# Patient Record
Sex: MALE | Race: WHITE
[De-identification: names, ages, dates, MRNs, and addresses within clinical notes are randomized per-mention and may not be internally consistent; named-entity substitution may affect disease eponyms.]

---

## 2018-07-09 ENCOUNTER — HOSPITAL ENCOUNTER (INPATIENT)
Dept: HOSPITAL 4 - SED | Age: 78
LOS: 9 days | Discharge: NURSING, REHAB OR LONG-TERM CARE | DRG: 720 | End: 2018-07-18
Attending: INTERNAL MEDICINE | Admitting: INTERNAL MEDICINE
Payer: MEDICAID

## 2018-07-09 VITALS — HEIGHT: 60 IN | BODY MASS INDEX: 23.36 KG/M2 | WEIGHT: 119 LBS

## 2018-07-09 VITALS — SYSTOLIC BLOOD PRESSURE: 114 MMHG

## 2018-07-09 VITALS — SYSTOLIC BLOOD PRESSURE: 93 MMHG

## 2018-07-09 VITALS — SYSTOLIC BLOOD PRESSURE: 94 MMHG

## 2018-07-09 DIAGNOSIS — Z22.322: ICD-10-CM

## 2018-07-09 DIAGNOSIS — E43: ICD-10-CM

## 2018-07-09 DIAGNOSIS — Z93.1: ICD-10-CM

## 2018-07-09 DIAGNOSIS — Z79.2: ICD-10-CM

## 2018-07-09 DIAGNOSIS — A41.9: Primary | ICD-10-CM

## 2018-07-09 DIAGNOSIS — Z74.01: ICD-10-CM

## 2018-07-09 DIAGNOSIS — N39.0: ICD-10-CM

## 2018-07-09 DIAGNOSIS — K21.9: ICD-10-CM

## 2018-07-09 DIAGNOSIS — Z79.01: ICD-10-CM

## 2018-07-09 DIAGNOSIS — Z79.899: ICD-10-CM

## 2018-07-09 DIAGNOSIS — N17.0: ICD-10-CM

## 2018-07-09 DIAGNOSIS — Z93.2: ICD-10-CM

## 2018-07-09 DIAGNOSIS — E87.6: ICD-10-CM

## 2018-07-09 DIAGNOSIS — I48.91: ICD-10-CM

## 2018-07-09 DIAGNOSIS — F79: ICD-10-CM

## 2018-07-09 DIAGNOSIS — G80.9: ICD-10-CM

## 2018-07-09 DIAGNOSIS — I10: ICD-10-CM

## 2018-07-09 DIAGNOSIS — E87.0: ICD-10-CM

## 2018-07-09 LAB
ALBUMIN SERPL BCP-MCNC: 2.4 G/DL (ref 3.4–4.8)
ALT SERPL W P-5'-P-CCNC: 37 U/L (ref 12–78)
ANION GAP SERPL CALCULATED.3IONS-SCNC: 11 MMOL/L (ref 5–15)
AST SERPL W P-5'-P-CCNC: 26 U/L (ref 10–37)
BASOPHILS # BLD AUTO: 0.1 K/UL (ref 0–0.2)
BASOPHILS NFR BLD AUTO: 0.2 % (ref 0–2)
BILIRUB SERPL-MCNC: 1.1 MG/DL (ref 0–1)
BUN SERPL-MCNC: 83 MG/DL (ref 8–21)
CALCIUM SERPL-MCNC: 9.7 MG/DL (ref 8.4–11)
CHLORIDE SERPL-SCNC: 109 MMOL/L (ref 98–107)
CREAT SERPL-MCNC: 1.67 MG/DL (ref 0.55–1.3)
EOSINOPHIL # BLD AUTO: 0 K/UL (ref 0–0.4)
EOSINOPHIL NFR BLD AUTO: 0.1 % (ref 0–4)
ERYTHROCYTE [DISTWIDTH] IN BLOOD BY AUTOMATED COUNT: 16.1 % (ref 9–15)
GFR SERPL CREATININE-BSD FRML MDRD: (no result) ML/MIN (ref 90–?)
GLUCOSE SERPL-MCNC: 125 MG/DL (ref 70–99)
HCT VFR BLD AUTO: 38.6 % (ref 36–54)
HGB BLD-MCNC: 13 G/DL (ref 14–18)
INR PPP: 1.1 (ref 0.8–1.2)
LYMPHOCYTES # BLD AUTO: 1.6 K/UL (ref 1–5.5)
LYMPHOCYTES NFR BLD AUTO: 5.8 % (ref 20.5–51.5)
MCH RBC QN AUTO: 30 PG (ref 27–31)
MCHC RBC AUTO-ENTMCNC: 34 % (ref 32–36)
MCV RBC AUTO: 88 FL (ref 79–98)
MONOCYTES # BLD MANUAL: 0.4 K/UL (ref 0–1)
MONOCYTES # BLD MANUAL: 1.5 % (ref 1.7–9.3)
NEUTROPHILS # BLD AUTO: 24.8 K/UL (ref 1.8–7.7)
NEUTROPHILS NFR BLD AUTO: 92.4 % (ref 40–70)
PLATELET # BLD AUTO: 338 K/UL (ref 130–430)
POTASSIUM SERPL-SCNC: 4.8 MMOL/L (ref 3.5–5.1)
PROTHROMBIN TIME: 11.2 SECS (ref 9.5–12.5)
RBC # BLD AUTO: 4.37 MIL/UL (ref 4.2–6.2)
SODIUM SERPLBLD-SCNC: 142 MMOL/L (ref 136–145)
WBC # BLD AUTO: 26.9 K/UL (ref 4.8–10.8)

## 2018-07-09 PROCEDURE — A9547 IN111 OXYQUINOLINE: HCPCS

## 2018-07-09 PROCEDURE — A5061 POUCH DRAINABLE W BARRIER AT: HCPCS

## 2018-07-09 RX ADMIN — DEXTROSE AND SODIUM CHLORIDE SCH MLS/HR: 5; 900 INJECTION, SOLUTION INTRAVENOUS at 19:55

## 2018-07-09 RX ADMIN — METRONIDAZOLE SCH MLS/HR: 500 SOLUTION INTRAVENOUS at 22:02

## 2018-07-09 RX ADMIN — METRONIDAZOLE SCH MLS/HR: 500 SOLUTION INTRAVENOUS at 19:55

## 2018-07-09 RX ADMIN — TAZOBACTAM SODIUM AND PIPERACILLIN SODIUM SCH MLS/HR: 375; 3 INJECTION, SOLUTION INTRAVENOUS at 23:48

## 2018-07-10 VITALS — SYSTOLIC BLOOD PRESSURE: 105 MMHG

## 2018-07-10 VITALS — SYSTOLIC BLOOD PRESSURE: 102 MMHG

## 2018-07-10 VITALS — SYSTOLIC BLOOD PRESSURE: 108 MMHG

## 2018-07-10 VITALS — SYSTOLIC BLOOD PRESSURE: 103 MMHG

## 2018-07-10 LAB
ALBUMIN SERPL BCP-MCNC: 1.9 G/DL (ref 3.4–4.8)
ALT SERPL W P-5'-P-CCNC: 27 U/L (ref 12–78)
ANION GAP SERPL CALCULATED.3IONS-SCNC: 8 MMOL/L (ref 5–15)
APPEARANCE UR: CLEAR
AST SERPL W P-5'-P-CCNC: 17 U/L (ref 10–37)
BACTERIA URNS QL MICRO: (no result) /HPF
BASOPHILS # BLD AUTO: 0 K/UL (ref 0–0.2)
BASOPHILS NFR BLD AUTO: 0.3 % (ref 0–2)
BILIRUB SERPL-MCNC: 0.6 MG/DL (ref 0–1)
BILIRUB UR QL STRIP: NEGATIVE
BUN SERPL-MCNC: 57 MG/DL (ref 8–21)
CALCIUM SERPL-MCNC: 9.1 MG/DL (ref 8.4–11)
CHLORIDE SERPL-SCNC: 122 MMOL/L (ref 98–107)
COLOR UR: YELLOW
CREAT SERPL-MCNC: 0.88 MG/DL (ref 0.55–1.3)
EOSINOPHIL # BLD AUTO: 0.4 K/UL (ref 0–0.4)
EOSINOPHIL NFR BLD AUTO: 3 % (ref 0–4)
ERYTHROCYTE [DISTWIDTH] IN BLOOD BY AUTOMATED COUNT: 15.8 % (ref 9–15)
GFR SERPL CREATININE-BSD FRML MDRD: (no result) ML/MIN (ref 90–?)
GLUCOSE SERPL-MCNC: 119 MG/DL (ref 70–99)
GLUCOSE UR STRIP-MCNC: NEGATIVE MG/DL
HCT VFR BLD AUTO: 30.3 % (ref 36–54)
HGB BLD-MCNC: 10.4 G/DL (ref 14–18)
HGB UR QL STRIP: (no result)
KETONES UR STRIP-MCNC: NEGATIVE MG/DL
LEUKOCYTE ESTERASE UR QL STRIP: (no result)
LYMPHOCYTES # BLD AUTO: 0.7 K/UL (ref 1–5.5)
LYMPHOCYTES NFR BLD AUTO: 4.7 % (ref 20.5–51.5)
MCH RBC QN AUTO: 30 PG (ref 27–31)
MCHC RBC AUTO-ENTMCNC: 34 % (ref 32–36)
MCV RBC AUTO: 88 FL (ref 79–98)
MONOCYTES # BLD MANUAL: 0.4 K/UL (ref 0–1)
MONOCYTES # BLD MANUAL: 3.1 % (ref 1.7–9.3)
NEUTROPHILS # BLD AUTO: 13 K/UL (ref 1.8–7.7)
NEUTROPHILS NFR BLD AUTO: 88.9 % (ref 40–70)
NITRITE UR QL STRIP: NEGATIVE
PH UR STRIP: >=9 [PH] (ref 5–8)
PLATELET # BLD AUTO: 244 K/UL (ref 130–430)
POTASSIUM SERPL-SCNC: 3.3 MMOL/L (ref 3.5–5.1)
PROT UR QL STRIP: (no result)
RBC # BLD AUTO: 3.43 MIL/UL (ref 4.2–6.2)
RBC #/AREA URNS HPF: (no result) /HPF (ref 0–3)
SODIUM SERPLBLD-SCNC: 151 MMOL/L (ref 136–145)
SP GR UR STRIP: 1.01 (ref 1–1.03)
UROBILINOGEN UR STRIP-MCNC: 1 MG/DL (ref 0.2–1)
WBC # BLD AUTO: 14.5 K/UL (ref 4.8–10.8)
WBC #/AREA URNS HPF: (no result) /HPF (ref 0–3)

## 2018-07-10 RX ADMIN — DEXTROSE AND SODIUM CHLORIDE SCH MLS/HR: 5; 900 INJECTION, SOLUTION INTRAVENOUS at 05:32

## 2018-07-10 RX ADMIN — TAZOBACTAM SODIUM AND PIPERACILLIN SODIUM SCH MLS/HR: 375; 3 INJECTION, SOLUTION INTRAVENOUS at 17:28

## 2018-07-10 RX ADMIN — SODIUM BICARBONATE SCH MLS/HR: 84 INJECTION, SOLUTION INTRAVENOUS at 18:43

## 2018-07-10 RX ADMIN — METRONIDAZOLE SCH MLS/HR: 500 SOLUTION INTRAVENOUS at 13:18

## 2018-07-10 RX ADMIN — TAZOBACTAM SODIUM AND PIPERACILLIN SODIUM SCH MLS/HR: 375; 3 INJECTION, SOLUTION INTRAVENOUS at 11:32

## 2018-07-10 RX ADMIN — DEXTROSE AND SODIUM CHLORIDE SCH MLS/HR: 5; 900 INJECTION, SOLUTION INTRAVENOUS at 13:18

## 2018-07-10 RX ADMIN — TAZOBACTAM SODIUM AND PIPERACILLIN SODIUM SCH MLS/HR: 375; 3 INJECTION, SOLUTION INTRAVENOUS at 05:33

## 2018-07-10 RX ADMIN — METRONIDAZOLE SCH MLS/HR: 500 SOLUTION INTRAVENOUS at 22:09

## 2018-07-10 RX ADMIN — METRONIDAZOLE SCH MLS/HR: 500 SOLUTION INTRAVENOUS at 05:33

## 2018-07-11 VITALS — SYSTOLIC BLOOD PRESSURE: 114 MMHG

## 2018-07-11 VITALS — SYSTOLIC BLOOD PRESSURE: 110 MMHG

## 2018-07-11 VITALS — SYSTOLIC BLOOD PRESSURE: 106 MMHG

## 2018-07-11 VITALS — SYSTOLIC BLOOD PRESSURE: 123 MMHG

## 2018-07-11 VITALS — SYSTOLIC BLOOD PRESSURE: 124 MMHG

## 2018-07-11 LAB
ALBUMIN SERPL BCP-MCNC: 1.9 G/DL (ref 3.4–4.8)
ALT SERPL W P-5'-P-CCNC: 26 U/L (ref 12–78)
ANION GAP SERPL CALCULATED.3IONS-SCNC: 7 MMOL/L (ref 5–15)
AST SERPL W P-5'-P-CCNC: 17 U/L (ref 10–37)
BASOPHILS # BLD AUTO: 0.1 K/UL (ref 0–0.2)
BASOPHILS NFR BLD AUTO: 0.6 % (ref 0–2)
BILIRUB SERPL-MCNC: 0.5 MG/DL (ref 0–1)
BUN SERPL-MCNC: 37 MG/DL (ref 8–21)
CALCIUM SERPL-MCNC: 9.1 MG/DL (ref 8.4–11)
CHLORIDE SERPL-SCNC: 123 MMOL/L (ref 98–107)
CREAT SERPL-MCNC: 0.73 MG/DL (ref 0.55–1.3)
EOSINOPHIL # BLD AUTO: 0.9 K/UL (ref 0–0.4)
EOSINOPHIL NFR BLD AUTO: 8 % (ref 0–4)
ERYTHROCYTE [DISTWIDTH] IN BLOOD BY AUTOMATED COUNT: 16.4 % (ref 9–15)
GFR SERPL CREATININE-BSD FRML MDRD: (no result) ML/MIN (ref 90–?)
GLUCOSE SERPL-MCNC: 109 MG/DL (ref 70–99)
HCT VFR BLD AUTO: 31.1 % (ref 36–54)
HGB BLD-MCNC: 10.5 G/DL (ref 14–18)
LYMPHOCYTES # BLD AUTO: 0.5 K/UL (ref 1–5.5)
LYMPHOCYTES NFR BLD AUTO: 4.2 % (ref 20.5–51.5)
MCH RBC QN AUTO: 30 PG (ref 27–31)
MCHC RBC AUTO-ENTMCNC: 34 % (ref 32–36)
MCV RBC AUTO: 89 FL (ref 79–98)
MONOCYTES # BLD MANUAL: 0.6 K/UL (ref 0–1)
MONOCYTES # BLD MANUAL: 4.9 % (ref 1.7–9.3)
NEUTROPHILS # BLD AUTO: 9.3 K/UL (ref 1.8–7.7)
NEUTROPHILS NFR BLD AUTO: 82.3 % (ref 40–70)
PLATELET # BLD AUTO: 242 K/UL (ref 130–430)
POTASSIUM SERPL-SCNC: 2.9 MMOL/L (ref 3.5–5.1)
RBC # BLD AUTO: 3.51 MIL/UL (ref 4.2–6.2)
SODIUM SERPLBLD-SCNC: 150 MMOL/L (ref 136–145)
WBC # BLD AUTO: 11.4 K/UL (ref 4.8–10.8)

## 2018-07-11 RX ADMIN — METRONIDAZOLE SCH MLS/HR: 500 SOLUTION INTRAVENOUS at 06:13

## 2018-07-11 RX ADMIN — METRONIDAZOLE SCH MLS/HR: 500 SOLUTION INTRAVENOUS at 21:10

## 2018-07-11 RX ADMIN — METRONIDAZOLE SCH MLS/HR: 500 SOLUTION INTRAVENOUS at 14:26

## 2018-07-11 RX ADMIN — TAZOBACTAM SODIUM AND PIPERACILLIN SODIUM SCH MLS/HR: 375; 3 INJECTION, SOLUTION INTRAVENOUS at 00:09

## 2018-07-11 RX ADMIN — SODIUM BICARBONATE SCH MLS/HR: 84 INJECTION, SOLUTION INTRAVENOUS at 23:16

## 2018-07-11 RX ADMIN — SODIUM BICARBONATE SCH MLS/HR: 84 INJECTION, SOLUTION INTRAVENOUS at 23:06

## 2018-07-11 RX ADMIN — TAZOBACTAM SODIUM AND PIPERACILLIN SODIUM SCH MLS/HR: 375; 3 INJECTION, SOLUTION INTRAVENOUS at 05:24

## 2018-07-11 RX ADMIN — TAZOBACTAM SODIUM AND PIPERACILLIN SODIUM SCH MLS/HR: 375; 3 INJECTION, SOLUTION INTRAVENOUS at 18:26

## 2018-07-11 RX ADMIN — TAZOBACTAM SODIUM AND PIPERACILLIN SODIUM SCH MLS/HR: 375; 3 INJECTION, SOLUTION INTRAVENOUS at 23:16

## 2018-07-11 RX ADMIN — TAZOBACTAM SODIUM AND PIPERACILLIN SODIUM SCH MLS/HR: 375; 3 INJECTION, SOLUTION INTRAVENOUS at 13:12

## 2018-07-12 VITALS — SYSTOLIC BLOOD PRESSURE: 134 MMHG

## 2018-07-12 VITALS — SYSTOLIC BLOOD PRESSURE: 147 MMHG

## 2018-07-12 VITALS — SYSTOLIC BLOOD PRESSURE: 139 MMHG

## 2018-07-12 VITALS — SYSTOLIC BLOOD PRESSURE: 121 MMHG

## 2018-07-12 VITALS — SYSTOLIC BLOOD PRESSURE: 122 MMHG

## 2018-07-12 LAB
ALBUMIN SERPL BCP-MCNC: 2 G/DL (ref 3.4–4.8)
ALT SERPL W P-5'-P-CCNC: 18 U/L (ref 12–78)
ANION GAP SERPL CALCULATED.3IONS-SCNC: 9 MMOL/L (ref 5–15)
AST SERPL W P-5'-P-CCNC: 12 U/L (ref 10–37)
BASOPHILS # BLD AUTO: 0 K/UL (ref 0–0.2)
BASOPHILS NFR BLD AUTO: 0.5 % (ref 0–2)
BILIRUB SERPL-MCNC: 0.6 MG/DL (ref 0–1)
BUN SERPL-MCNC: 18 MG/DL (ref 8–21)
CALCIUM SERPL-MCNC: 8.6 MG/DL (ref 8.4–11)
CHLORIDE SERPL-SCNC: 118 MMOL/L (ref 98–107)
CREAT SERPL-MCNC: 0.58 MG/DL (ref 0.55–1.3)
EOSINOPHIL # BLD AUTO: 0.5 K/UL (ref 0–0.4)
EOSINOPHIL NFR BLD AUTO: 7 % (ref 0–4)
ERYTHROCYTE [DISTWIDTH] IN BLOOD BY AUTOMATED COUNT: 15.9 % (ref 9–15)
GFR SERPL CREATININE-BSD FRML MDRD: (no result) ML/MIN (ref 90–?)
GLUCOSE SERPL-MCNC: 90 MG/DL (ref 70–99)
HCT VFR BLD AUTO: 31.8 % (ref 36–54)
HGB BLD-MCNC: 10.6 G/DL (ref 14–18)
LYMPHOCYTES # BLD AUTO: 1.1 K/UL (ref 1–5.5)
LYMPHOCYTES NFR BLD AUTO: 17.2 % (ref 20.5–51.5)
MCH RBC QN AUTO: 29 PG (ref 27–31)
MCHC RBC AUTO-ENTMCNC: 33 % (ref 32–36)
MCV RBC AUTO: 88 FL (ref 79–98)
MONOCYTES # BLD MANUAL: 0.5 K/UL (ref 0–1)
MONOCYTES # BLD MANUAL: 8.2 % (ref 1.7–9.3)
NEUTROPHILS # BLD AUTO: 4.4 K/UL (ref 1.8–7.7)
NEUTROPHILS NFR BLD AUTO: 67.1 % (ref 40–70)
PLATELET # BLD AUTO: 232 K/UL (ref 130–430)
POTASSIUM SERPL-SCNC: 3.2 MMOL/L (ref 3.5–5.1)
RBC # BLD AUTO: 3.6 MIL/UL (ref 4.2–6.2)
SODIUM SERPLBLD-SCNC: 148 MMOL/L (ref 136–145)
WBC # BLD AUTO: 6.5 K/UL (ref 4.8–10.8)

## 2018-07-12 RX ADMIN — MUPIROCIN SCH APPLIC: 20 OINTMENT TOPICAL at 21:59

## 2018-07-12 RX ADMIN — METRONIDAZOLE SCH MLS/HR: 500 SOLUTION INTRAVENOUS at 21:58

## 2018-07-12 RX ADMIN — SODIUM BICARBONATE SCH MLS/HR: 84 INJECTION, SOLUTION INTRAVENOUS at 14:28

## 2018-07-12 RX ADMIN — CASTOR OIL AND BALSAM, PERU SCH GM: 788; 87 OINTMENT TOPICAL at 10:08

## 2018-07-12 RX ADMIN — TAZOBACTAM SODIUM AND PIPERACILLIN SODIUM SCH MLS/HR: 375; 3 INJECTION, SOLUTION INTRAVENOUS at 18:30

## 2018-07-12 RX ADMIN — TAZOBACTAM SODIUM AND PIPERACILLIN SODIUM SCH MLS/HR: 375; 3 INJECTION, SOLUTION INTRAVENOUS at 12:21

## 2018-07-12 RX ADMIN — METRONIDAZOLE SCH MLS/HR: 500 SOLUTION INTRAVENOUS at 14:27

## 2018-07-12 RX ADMIN — METRONIDAZOLE SCH MLS/HR: 500 SOLUTION INTRAVENOUS at 05:44

## 2018-07-12 RX ADMIN — TAZOBACTAM SODIUM AND PIPERACILLIN SODIUM SCH MLS/HR: 375; 3 INJECTION, SOLUTION INTRAVENOUS at 05:17

## 2018-07-13 VITALS — SYSTOLIC BLOOD PRESSURE: 111 MMHG

## 2018-07-13 VITALS — SYSTOLIC BLOOD PRESSURE: 140 MMHG

## 2018-07-13 VITALS — SYSTOLIC BLOOD PRESSURE: 123 MMHG

## 2018-07-13 VITALS — SYSTOLIC BLOOD PRESSURE: 143 MMHG

## 2018-07-13 VITALS — SYSTOLIC BLOOD PRESSURE: 132 MMHG

## 2018-07-13 RX ADMIN — SODIUM BICARBONATE SCH MLS/HR: 84 INJECTION, SOLUTION INTRAVENOUS at 00:20

## 2018-07-13 RX ADMIN — TAZOBACTAM SODIUM AND PIPERACILLIN SODIUM SCH MLS/HR: 375; 3 INJECTION, SOLUTION INTRAVENOUS at 00:18

## 2018-07-13 RX ADMIN — MUPIROCIN SCH APPLIC: 20 OINTMENT TOPICAL at 09:25

## 2018-07-13 RX ADMIN — SODIUM BICARBONATE SCH MLS/HR: 84 INJECTION, SOLUTION INTRAVENOUS at 11:13

## 2018-07-13 RX ADMIN — DEXTROSE SCH MLS/HR: 50 INJECTION, SOLUTION INTRAVENOUS at 22:34

## 2018-07-13 RX ADMIN — METRONIDAZOLE SCH MLS/HR: 500 SOLUTION INTRAVENOUS at 13:16

## 2018-07-13 RX ADMIN — TAZOBACTAM SODIUM AND PIPERACILLIN SODIUM SCH MLS/HR: 375; 3 INJECTION, SOLUTION INTRAVENOUS at 06:27

## 2018-07-13 RX ADMIN — METRONIDAZOLE SCH MLS/HR: 500 SOLUTION INTRAVENOUS at 06:27

## 2018-07-13 RX ADMIN — CASTOR OIL AND BALSAM, PERU SCH APPLIC: 788; 87 OINTMENT TOPICAL at 09:26

## 2018-07-13 RX ADMIN — TAZOBACTAM SODIUM AND PIPERACILLIN SODIUM SCH MLS/HR: 375; 3 INJECTION, SOLUTION INTRAVENOUS at 11:12

## 2018-07-13 RX ADMIN — MUPIROCIN SCH APPLIC: 20 OINTMENT TOPICAL at 21:38

## 2018-07-13 RX ADMIN — TAZOBACTAM SODIUM AND PIPERACILLIN SODIUM SCH MLS/HR: 375; 3 INJECTION, SOLUTION INTRAVENOUS at 17:14

## 2018-07-14 VITALS — SYSTOLIC BLOOD PRESSURE: 107 MMHG

## 2018-07-14 VITALS — SYSTOLIC BLOOD PRESSURE: 142 MMHG

## 2018-07-14 VITALS — SYSTOLIC BLOOD PRESSURE: 118 MMHG

## 2018-07-14 VITALS — SYSTOLIC BLOOD PRESSURE: 138 MMHG

## 2018-07-14 VITALS — SYSTOLIC BLOOD PRESSURE: 110 MMHG

## 2018-07-14 RX ADMIN — SODIUM BICARBONATE SCH MLS/HR: 84 INJECTION, SOLUTION INTRAVENOUS at 04:24

## 2018-07-14 RX ADMIN — POTASSIUM CHLORIDE SCH MEQ: 1.5 POWDER, FOR SOLUTION ORAL at 20:33

## 2018-07-14 RX ADMIN — MUPIROCIN SCH APPLIC: 20 OINTMENT TOPICAL at 09:00

## 2018-07-14 RX ADMIN — DEXTROSE SCH MLS/HR: 50 INJECTION, SOLUTION INTRAVENOUS at 20:33

## 2018-07-14 RX ADMIN — SODIUM BICARBONATE SCH MLS/HR: 84 INJECTION, SOLUTION INTRAVENOUS at 19:34

## 2018-07-14 RX ADMIN — DEXTROSE SCH MLS/HR: 50 INJECTION, SOLUTION INTRAVENOUS at 09:00

## 2018-07-14 RX ADMIN — MUPIROCIN SCH GM: 20 OINTMENT TOPICAL at 20:34

## 2018-07-14 RX ADMIN — CASTOR OIL AND BALSAM, PERU SCH APPLIC: 788; 87 OINTMENT TOPICAL at 09:01

## 2018-07-15 VITALS — SYSTOLIC BLOOD PRESSURE: 119 MMHG

## 2018-07-15 VITALS — SYSTOLIC BLOOD PRESSURE: 128 MMHG

## 2018-07-15 VITALS — SYSTOLIC BLOOD PRESSURE: 109 MMHG

## 2018-07-15 VITALS — SYSTOLIC BLOOD PRESSURE: 124 MMHG

## 2018-07-15 VITALS — SYSTOLIC BLOOD PRESSURE: 122 MMHG

## 2018-07-15 LAB
ANION GAP SERPL CALCULATED.3IONS-SCNC: 8 MMOL/L (ref 5–15)
BASOPHILS # BLD AUTO: 0 K/UL (ref 0–0.2)
BASOPHILS NFR BLD AUTO: 0.3 % (ref 0–2)
BUN SERPL-MCNC: 13 MG/DL (ref 8–21)
CALCIUM SERPL-MCNC: 8.3 MG/DL (ref 8.4–11)
CHLORIDE SERPL-SCNC: 106 MMOL/L (ref 98–107)
CREAT SERPL-MCNC: 0.44 MG/DL (ref 0.55–1.3)
EOSINOPHIL # BLD AUTO: 0.6 K/UL (ref 0–0.4)
EOSINOPHIL NFR BLD AUTO: 5.7 % (ref 0–4)
ERYTHROCYTE [DISTWIDTH] IN BLOOD BY AUTOMATED COUNT: 15.3 % (ref 9–15)
GFR SERPL CREATININE-BSD FRML MDRD: (no result) ML/MIN (ref 90–?)
GLUCOSE SERPL-MCNC: 115 MG/DL (ref 70–99)
HCT VFR BLD AUTO: 31 % (ref 36–54)
HGB BLD-MCNC: 10.6 G/DL (ref 14–18)
LYMPHOCYTES # BLD AUTO: 2.3 K/UL (ref 1–5.5)
LYMPHOCYTES NFR BLD AUTO: 23.3 % (ref 20.5–51.5)
MCH RBC QN AUTO: 30 PG (ref 27–31)
MCHC RBC AUTO-ENTMCNC: 34 % (ref 32–36)
MCV RBC AUTO: 87 FL (ref 79–98)
MONOCYTES # BLD MANUAL: 0.8 K/UL (ref 0–1)
MONOCYTES # BLD MANUAL: 8.1 % (ref 1.7–9.3)
NEUTROPHILS # BLD AUTO: 6.2 K/UL (ref 1.8–7.7)
NEUTROPHILS NFR BLD AUTO: 62.6 % (ref 40–70)
PLATELET # BLD AUTO: 224 K/UL (ref 130–430)
POTASSIUM SERPL-SCNC: 3.3 MMOL/L (ref 3.5–5.1)
RBC # BLD AUTO: 3.55 MIL/UL (ref 4.2–6.2)
SODIUM SERPLBLD-SCNC: 137 MMOL/L (ref 136–145)
WBC # BLD AUTO: 9.9 K/UL (ref 4.8–10.8)

## 2018-07-15 RX ADMIN — POTASSIUM CHLORIDE SCH MEQ: 1.5 POWDER, FOR SOLUTION ORAL at 20:37

## 2018-07-15 RX ADMIN — SODIUM CHLORIDE SCH MLS/HR: 9 INJECTION, SOLUTION INTRAVENOUS at 01:46

## 2018-07-15 RX ADMIN — MUPIROCIN SCH GM: 20 OINTMENT TOPICAL at 10:06

## 2018-07-15 RX ADMIN — POTASSIUM CHLORIDE SCH MEQ: 1.5 POWDER, FOR SOLUTION ORAL at 10:05

## 2018-07-15 RX ADMIN — MUPIROCIN SCH GM: 20 OINTMENT TOPICAL at 20:37

## 2018-07-15 RX ADMIN — CASTOR OIL AND BALSAM, PERU SCH APPLIC: 788; 87 OINTMENT TOPICAL at 10:07

## 2018-07-15 RX ADMIN — SODIUM BICARBONATE SCH MLS/HR: 84 INJECTION, SOLUTION INTRAVENOUS at 13:16

## 2018-07-15 RX ADMIN — DEXTROSE SCH MLS/HR: 50 INJECTION, SOLUTION INTRAVENOUS at 10:06

## 2018-07-15 RX ADMIN — DEXTROSE SCH MLS/HR: 50 INJECTION, SOLUTION INTRAVENOUS at 20:37

## 2018-07-16 VITALS — SYSTOLIC BLOOD PRESSURE: 135 MMHG

## 2018-07-16 VITALS — SYSTOLIC BLOOD PRESSURE: 124 MMHG

## 2018-07-16 VITALS — SYSTOLIC BLOOD PRESSURE: 123 MMHG

## 2018-07-16 VITALS — SYSTOLIC BLOOD PRESSURE: 129 MMHG

## 2018-07-16 LAB
ANION GAP SERPL CALCULATED.3IONS-SCNC: 4 MMOL/L (ref 5–15)
BUN SERPL-MCNC: 16 MG/DL (ref 8–21)
CALCIUM SERPL-MCNC: 8.2 MG/DL (ref 8.4–11)
CHLORIDE SERPL-SCNC: 105 MMOL/L (ref 98–107)
CREAT SERPL-MCNC: 0.47 MG/DL (ref 0.55–1.3)
GFR SERPL CREATININE-BSD FRML MDRD: (no result) ML/MIN (ref 90–?)
GLUCOSE SERPL-MCNC: 114 MG/DL (ref 70–99)
POTASSIUM SERPL-SCNC: 3.7 MMOL/L (ref 3.5–5.1)
SODIUM SERPLBLD-SCNC: 135 MMOL/L (ref 136–145)

## 2018-07-16 RX ADMIN — DEXTROSE SCH MLS/HR: 50 INJECTION, SOLUTION INTRAVENOUS at 08:38

## 2018-07-16 RX ADMIN — DEXTROSE SCH MLS/HR: 50 INJECTION, SOLUTION INTRAVENOUS at 20:22

## 2018-07-16 RX ADMIN — CASTOR OIL AND BALSAM, PERU SCH APPLIC: 788; 87 OINTMENT TOPICAL at 08:37

## 2018-07-16 RX ADMIN — POTASSIUM CHLORIDE SCH MEQ: 1.5 POWDER, FOR SOLUTION ORAL at 20:22

## 2018-07-16 RX ADMIN — POTASSIUM CHLORIDE SCH MEQ: 1.5 POWDER, FOR SOLUTION ORAL at 08:37

## 2018-07-16 RX ADMIN — MUPIROCIN SCH APPLIC: 20 OINTMENT TOPICAL at 20:23

## 2018-07-16 RX ADMIN — SODIUM BICARBONATE SCH MLS/HR: 84 INJECTION, SOLUTION INTRAVENOUS at 17:27

## 2018-07-16 RX ADMIN — SODIUM CHLORIDE SCH MLS/HR: 9 INJECTION, SOLUTION INTRAVENOUS at 00:10

## 2018-07-16 RX ADMIN — MUPIROCIN SCH APPLIC: 20 OINTMENT TOPICAL at 08:37

## 2018-07-16 RX ADMIN — SODIUM BICARBONATE SCH MLS/HR: 84 INJECTION, SOLUTION INTRAVENOUS at 03:29

## 2018-07-17 VITALS — SYSTOLIC BLOOD PRESSURE: 130 MMHG

## 2018-07-17 VITALS — SYSTOLIC BLOOD PRESSURE: 112 MMHG

## 2018-07-17 VITALS — SYSTOLIC BLOOD PRESSURE: 121 MMHG

## 2018-07-17 LAB
ANION GAP SERPL CALCULATED.3IONS-SCNC: 6 MMOL/L (ref 5–15)
BUN SERPL-MCNC: 16 MG/DL (ref 8–21)
CALCIUM SERPL-MCNC: 8.5 MG/DL (ref 8.4–11)
CHLORIDE SERPL-SCNC: 103 MMOL/L (ref 98–107)
CREAT SERPL-MCNC: 0.46 MG/DL (ref 0.55–1.3)
GFR SERPL CREATININE-BSD FRML MDRD: (no result) ML/MIN (ref 90–?)
GLUCOSE SERPL-MCNC: 127 MG/DL (ref 70–99)
POTASSIUM SERPL-SCNC: 3.6 MMOL/L (ref 3.5–5.1)
SODIUM SERPLBLD-SCNC: 135 MMOL/L (ref 136–145)

## 2018-07-17 RX ADMIN — SODIUM BICARBONATE SCH MLS/HR: 84 INJECTION, SOLUTION INTRAVENOUS at 22:06

## 2018-07-17 RX ADMIN — DEXTROSE SCH MLS/HR: 50 INJECTION, SOLUTION INTRAVENOUS at 20:56

## 2018-07-17 RX ADMIN — DEXTROSE SCH MLS/HR: 50 INJECTION, SOLUTION INTRAVENOUS at 11:02

## 2018-07-17 RX ADMIN — MUPIROCIN SCH APPLIC: 20 OINTMENT TOPICAL at 20:56

## 2018-07-17 RX ADMIN — SODIUM BICARBONATE SCH MLS/HR: 84 INJECTION, SOLUTION INTRAVENOUS at 09:33

## 2018-07-17 RX ADMIN — POTASSIUM CHLORIDE SCH MEQ: 1.5 POWDER, FOR SOLUTION ORAL at 20:56

## 2018-07-17 RX ADMIN — MUPIROCIN SCH APPLIC: 20 OINTMENT TOPICAL at 09:32

## 2018-07-17 RX ADMIN — SODIUM CHLORIDE SCH MLS/HR: 9 INJECTION, SOLUTION INTRAVENOUS at 14:39

## 2018-07-17 RX ADMIN — SODIUM CHLORIDE SCH MLS/HR: 9 INJECTION, SOLUTION INTRAVENOUS at 00:19

## 2018-07-17 RX ADMIN — CASTOR OIL AND BALSAM, PERU SCH APPLIC: 788; 87 OINTMENT TOPICAL at 09:33

## 2018-07-17 RX ADMIN — POTASSIUM CHLORIDE SCH MEQ: 1.5 POWDER, FOR SOLUTION ORAL at 09:32

## 2018-07-18 VITALS — SYSTOLIC BLOOD PRESSURE: 122 MMHG

## 2018-07-18 VITALS — SYSTOLIC BLOOD PRESSURE: 139 MMHG

## 2018-07-18 VITALS — SYSTOLIC BLOOD PRESSURE: 129 MMHG

## 2018-07-18 VITALS — SYSTOLIC BLOOD PRESSURE: 132 MMHG

## 2018-07-18 RX ADMIN — CASTOR OIL AND BALSAM, PERU SCH APPLIC: 788; 87 OINTMENT TOPICAL at 18:00

## 2018-07-18 RX ADMIN — SODIUM BICARBONATE SCH MLS/HR: 84 INJECTION, SOLUTION INTRAVENOUS at 00:46

## 2018-07-18 RX ADMIN — SODIUM CHLORIDE SCH MLS/HR: 9 INJECTION, SOLUTION INTRAVENOUS at 00:45

## 2018-07-18 RX ADMIN — POTASSIUM CHLORIDE SCH MEQ: 1.5 POWDER, FOR SOLUTION ORAL at 09:43

## 2018-07-18 RX ADMIN — DEXTROSE SCH MLS/HR: 50 INJECTION, SOLUTION INTRAVENOUS at 09:44

## 2018-07-28 ENCOUNTER — HOSPITAL ENCOUNTER (INPATIENT)
Dept: HOSPITAL 4 - SED | Age: 78
LOS: 4 days | Discharge: NURSING, REHAB OR LONG-TERM CARE | DRG: 501 | End: 2018-08-01
Attending: FAMILY MEDICINE | Admitting: FAMILY MEDICINE
Payer: MEDICAID

## 2018-07-28 VITALS — SYSTOLIC BLOOD PRESSURE: 116 MMHG

## 2018-07-28 VITALS — SYSTOLIC BLOOD PRESSURE: 113 MMHG

## 2018-07-28 VITALS — WEIGHT: 110 LBS | HEIGHT: 60 IN | BODY MASS INDEX: 21.6 KG/M2

## 2018-07-28 VITALS — SYSTOLIC BLOOD PRESSURE: 144 MMHG

## 2018-07-28 VITALS — SYSTOLIC BLOOD PRESSURE: 118 MMHG

## 2018-07-28 VITALS — SYSTOLIC BLOOD PRESSURE: 121 MMHG

## 2018-07-28 VITALS — SYSTOLIC BLOOD PRESSURE: 135 MMHG

## 2018-07-28 DIAGNOSIS — I10: ICD-10-CM

## 2018-07-28 DIAGNOSIS — D64.9: ICD-10-CM

## 2018-07-28 DIAGNOSIS — F79: ICD-10-CM

## 2018-07-28 DIAGNOSIS — Z79.899: ICD-10-CM

## 2018-07-28 DIAGNOSIS — B37.49: Primary | ICD-10-CM

## 2018-07-28 DIAGNOSIS — N40.0: ICD-10-CM

## 2018-07-28 DIAGNOSIS — R09.02: ICD-10-CM

## 2018-07-28 DIAGNOSIS — Z93.1: ICD-10-CM

## 2018-07-28 DIAGNOSIS — J69.0: ICD-10-CM

## 2018-07-28 DIAGNOSIS — K21.9: ICD-10-CM

## 2018-07-28 DIAGNOSIS — Z87.440: ICD-10-CM

## 2018-07-28 DIAGNOSIS — I48.91: ICD-10-CM

## 2018-07-28 DIAGNOSIS — B00.2: ICD-10-CM

## 2018-07-28 DIAGNOSIS — F03.90: ICD-10-CM

## 2018-07-28 DIAGNOSIS — E86.0: ICD-10-CM

## 2018-07-28 DIAGNOSIS — N31.9: ICD-10-CM

## 2018-07-28 DIAGNOSIS — E43: ICD-10-CM

## 2018-07-28 DIAGNOSIS — J44.9: ICD-10-CM

## 2018-07-28 LAB
ALBUMIN SERPL BCP-MCNC: 2.1 G/DL (ref 3.4–4.8)
ALT SERPL W P-5'-P-CCNC: 33 U/L (ref 12–78)
ANION GAP SERPL CALCULATED.3IONS-SCNC: 8 MMOL/L (ref 5–15)
APPEARANCE UR: (no result)
AST SERPL W P-5'-P-CCNC: 23 U/L (ref 10–37)
BACTERIA URNS QL MICRO: (no result) /HPF
BASOPHILS # BLD AUTO: 0.1 K/UL (ref 0–0.2)
BASOPHILS NFR BLD AUTO: 0.6 % (ref 0–2)
BILIRUB SERPL-MCNC: 0.3 MG/DL (ref 0–1)
BILIRUB UR QL STRIP: NEGATIVE
BUN SERPL-MCNC: 51 MG/DL (ref 8–21)
CALCIUM SERPL-MCNC: 9.6 MG/DL (ref 8.4–11)
CHLORIDE SERPL-SCNC: 104 MMOL/L (ref 98–107)
COARSE GRAN CASTS URNS QL MICRO: (no result) /LPF
COLOR UR: YELLOW
CREAT SERPL-MCNC: 0.55 MG/DL (ref 0.55–1.3)
EOSINOPHIL # BLD AUTO: 0.7 K/UL (ref 0–0.4)
EOSINOPHIL NFR BLD AUTO: 8.7 % (ref 0–4)
ERYTHROCYTE [DISTWIDTH] IN BLOOD BY AUTOMATED COUNT: 16.2 % (ref 9–15)
GFR SERPL CREATININE-BSD FRML MDRD: (no result) ML/MIN (ref 90–?)
GLUCOSE SERPL-MCNC: 118 MG/DL (ref 70–99)
GLUCOSE UR STRIP-MCNC: NEGATIVE MG/DL
HCT VFR BLD AUTO: 31.8 % (ref 36–54)
HGB BLD-MCNC: 10.8 G/DL (ref 14–18)
HGB UR QL STRIP: (no result)
INR PPP: 1 (ref 0.8–1.2)
KETONES UR STRIP-MCNC: NEGATIVE MG/DL
LEUKOCYTE ESTERASE UR QL STRIP: (no result)
LIPASE SERPL-CCNC: 100 U/L (ref 73–393)
LYMPHOCYTES # BLD AUTO: 1 K/UL (ref 1–5.5)
LYMPHOCYTES NFR BLD AUTO: 12.4 % (ref 20.5–51.5)
MCH RBC QN AUTO: 30 PG (ref 27–31)
MCHC RBC AUTO-ENTMCNC: 34 % (ref 32–36)
MCV RBC AUTO: 89 FL (ref 79–98)
MONOCYTES # BLD MANUAL: 0.8 K/UL (ref 0–1)
MONOCYTES # BLD MANUAL: 9.6 % (ref 1.7–9.3)
NEUTROPHILS # BLD AUTO: 5.8 K/UL (ref 1.8–7.7)
NEUTROPHILS NFR BLD AUTO: 68.7 % (ref 40–70)
NITRITE UR QL STRIP: NEGATIVE
PH UR STRIP: 6 [PH] (ref 5–8)
PLATELET # BLD AUTO: 234 K/UL (ref 130–430)
POTASSIUM SERPL-SCNC: 3.9 MMOL/L (ref 3.5–5.1)
PROT UR QL STRIP: (no result)
PROTHROMBIN TIME: 10.3 SECS (ref 9.5–12.5)
RBC # BLD AUTO: 3.6 MIL/UL (ref 4.2–6.2)
SODIUM SERPLBLD-SCNC: 137 MMOL/L (ref 136–145)
SP GR UR STRIP: 1.01 (ref 1–1.03)
UROBILINOGEN UR STRIP-MCNC: 0.2 MG/DL (ref 0.2–1)
WBC # BLD AUTO: 8.4 K/UL (ref 4.8–10.8)
WBC #/AREA URNS HPF: >100 /HPF (ref 0–3)

## 2018-07-28 PROCEDURE — A5061 POUCH DRAINABLE W BARRIER AT: HCPCS

## 2018-07-28 RX ADMIN — SOTALOL HYDROCHLORIDE SCH MG: 80 TABLET ORAL at 21:38

## 2018-07-28 RX ADMIN — ENOXAPARIN SODIUM SCH MG: 30 INJECTION SUBCUTANEOUS at 21:35

## 2018-07-28 RX ADMIN — Medication SCH UNIT: at 21:37

## 2018-07-28 RX ADMIN — DEXTROSE SCH MLS/HR: 50 INJECTION, SOLUTION INTRAVENOUS at 09:49

## 2018-07-28 RX ADMIN — POTASSIUM CHLORIDE, DEXTROSE MONOHYDRATE AND SODIUM CHLORIDE SCH MLS/HR: 150; 5; 450 INJECTION, SOLUTION INTRAVENOUS at 18:50

## 2018-07-28 RX ADMIN — DEXTROSE SCH MLS/HR: 50 INJECTION, SOLUTION INTRAVENOUS at 21:37

## 2018-07-28 RX ADMIN — POTASSIUM CHLORIDE, DEXTROSE MONOHYDRATE AND SODIUM CHLORIDE SCH MLS/HR: 150; 5; 450 INJECTION, SOLUTION INTRAVENOUS at 09:49

## 2018-07-29 VITALS — SYSTOLIC BLOOD PRESSURE: 119 MMHG

## 2018-07-29 VITALS — SYSTOLIC BLOOD PRESSURE: 110 MMHG

## 2018-07-29 VITALS — SYSTOLIC BLOOD PRESSURE: 126 MMHG

## 2018-07-29 VITALS — SYSTOLIC BLOOD PRESSURE: 112 MMHG

## 2018-07-29 LAB
ANION GAP SERPL CALCULATED.3IONS-SCNC: 6 MMOL/L (ref 5–15)
BASOPHILS # BLD AUTO: 0.1 K/UL (ref 0–0.2)
BASOPHILS NFR BLD AUTO: 0.9 % (ref 0–2)
BUN SERPL-MCNC: 23 MG/DL (ref 8–21)
CALCIUM SERPL-MCNC: 9 MG/DL (ref 8.4–11)
CHLORIDE SERPL-SCNC: 108 MMOL/L (ref 98–107)
CREAT SERPL-MCNC: 0.38 MG/DL (ref 0.55–1.3)
EOSINOPHIL # BLD AUTO: 0.4 K/UL (ref 0–0.4)
EOSINOPHIL NFR BLD AUTO: 7.8 % (ref 0–4)
ERYTHROCYTE [DISTWIDTH] IN BLOOD BY AUTOMATED COUNT: 15.6 % (ref 9–15)
GFR SERPL CREATININE-BSD FRML MDRD: (no result) ML/MIN (ref 90–?)
GLUCOSE SERPL-MCNC: 109 MG/DL (ref 70–99)
HCT VFR BLD AUTO: 26.9 % (ref 36–54)
HGB BLD-MCNC: 9.1 G/DL (ref 14–18)
LYMPHOCYTES # BLD AUTO: 1.5 K/UL (ref 1–5.5)
LYMPHOCYTES NFR BLD AUTO: 26.1 % (ref 20.5–51.5)
MCH RBC QN AUTO: 30 PG (ref 27–31)
MCHC RBC AUTO-ENTMCNC: 34 % (ref 32–36)
MCV RBC AUTO: 89 FL (ref 79–98)
MONOCYTES # BLD MANUAL: 0.7 K/UL (ref 0–1)
MONOCYTES # BLD MANUAL: 11.9 % (ref 1.7–9.3)
NEUTROPHILS # BLD AUTO: 2.9 K/UL (ref 1.8–7.7)
NEUTROPHILS NFR BLD AUTO: 53.3 % (ref 40–70)
PLATELET # BLD AUTO: 200 K/UL (ref 130–430)
POTASSIUM SERPL-SCNC: 3.9 MMOL/L (ref 3.5–5.1)
RBC # BLD AUTO: 3.02 MIL/UL (ref 4.2–6.2)
SODIUM SERPLBLD-SCNC: 138 MMOL/L (ref 136–145)
WBC # BLD AUTO: 5.6 K/UL (ref 4.8–10.8)

## 2018-07-29 RX ADMIN — SOTALOL HYDROCHLORIDE SCH MG: 80 TABLET ORAL at 21:03

## 2018-07-29 RX ADMIN — POTASSIUM CHLORIDE, DEXTROSE MONOHYDRATE AND SODIUM CHLORIDE SCH MLS/HR: 150; 5; 450 INJECTION, SOLUTION INTRAVENOUS at 05:44

## 2018-07-29 RX ADMIN — ENOXAPARIN SODIUM SCH MG: 30 INJECTION SUBCUTANEOUS at 21:02

## 2018-07-29 RX ADMIN — ACETAMINOPHEN SCH MG: 325 TABLET ORAL at 09:30

## 2018-07-29 RX ADMIN — DEXTROSE SCH MLS/HR: 50 INJECTION, SOLUTION INTRAVENOUS at 09:32

## 2018-07-29 RX ADMIN — DEXTROSE SCH MLS/HR: 50 INJECTION, SOLUTION INTRAVENOUS at 21:03

## 2018-07-29 RX ADMIN — SOTALOL HYDROCHLORIDE SCH MG: 80 TABLET ORAL at 09:00

## 2018-07-29 RX ADMIN — FINASTERIDE SCH MG: 5 TABLET, FILM COATED ORAL at 09:30

## 2018-07-29 RX ADMIN — POTASSIUM CHLORIDE, DEXTROSE MONOHYDRATE AND SODIUM CHLORIDE SCH MLS/HR: 150; 5; 450 INJECTION, SOLUTION INTRAVENOUS at 14:45

## 2018-07-29 RX ADMIN — Medication SCH UNIT: at 09:29

## 2018-07-29 RX ADMIN — Medication SCH GM: at 09:29

## 2018-07-29 RX ADMIN — Medication SCH UNIT: at 21:03

## 2018-07-30 VITALS — SYSTOLIC BLOOD PRESSURE: 110 MMHG

## 2018-07-30 VITALS — SYSTOLIC BLOOD PRESSURE: 115 MMHG

## 2018-07-30 VITALS — SYSTOLIC BLOOD PRESSURE: 125 MMHG

## 2018-07-30 VITALS — SYSTOLIC BLOOD PRESSURE: 144 MMHG

## 2018-07-30 VITALS — SYSTOLIC BLOOD PRESSURE: 119 MMHG

## 2018-07-30 LAB
ANION GAP SERPL CALCULATED.3IONS-SCNC: 6 MMOL/L (ref 5–15)
BASOPHILS # BLD AUTO: 0.1 K/UL (ref 0–0.2)
BASOPHILS NFR BLD AUTO: 2.1 % (ref 0–2)
BUN SERPL-MCNC: 17 MG/DL (ref 8–21)
CALCIUM SERPL-MCNC: 8.8 MG/DL (ref 8.4–11)
CHLORIDE SERPL-SCNC: 104 MMOL/L (ref 98–107)
CREAT SERPL-MCNC: 0.32 MG/DL (ref 0.55–1.3)
EOSINOPHIL # BLD AUTO: 0.5 K/UL (ref 0–0.4)
EOSINOPHIL NFR BLD AUTO: 8.4 % (ref 0–4)
ERYTHROCYTE [DISTWIDTH] IN BLOOD BY AUTOMATED COUNT: 15.8 % (ref 9–15)
GFR SERPL CREATININE-BSD FRML MDRD: (no result) ML/MIN (ref 90–?)
GLUCOSE SERPL-MCNC: 103 MG/DL (ref 70–99)
HCT VFR BLD AUTO: 27.5 % (ref 36–54)
HGB BLD-MCNC: 9.1 G/DL (ref 14–18)
LYMPHOCYTES # BLD AUTO: 1.6 K/UL (ref 1–5.5)
LYMPHOCYTES NFR BLD AUTO: 28.7 % (ref 20.5–51.5)
MCH RBC QN AUTO: 29 PG (ref 27–31)
MCHC RBC AUTO-ENTMCNC: 33 % (ref 32–36)
MCV RBC AUTO: 89 FL (ref 79–98)
MONOCYTES # BLD MANUAL: 0.6 K/UL (ref 0–1)
MONOCYTES # BLD MANUAL: 11 % (ref 1.7–9.3)
NEUTROPHILS # BLD AUTO: 2.7 K/UL (ref 1.8–7.7)
NEUTROPHILS NFR BLD AUTO: 49.8 % (ref 40–70)
PLATELET # BLD AUTO: 222 K/UL (ref 130–430)
POTASSIUM SERPL-SCNC: 4.1 MMOL/L (ref 3.5–5.1)
RBC # BLD AUTO: 3.09 MIL/UL (ref 4.2–6.2)
SODIUM SERPLBLD-SCNC: 135 MMOL/L (ref 136–145)
WBC # BLD AUTO: 5.5 K/UL (ref 4.8–10.8)

## 2018-07-30 RX ADMIN — Medication SCH UNIT: at 21:00

## 2018-07-30 RX ADMIN — FINASTERIDE SCH MG: 5 TABLET, FILM COATED ORAL at 08:36

## 2018-07-30 RX ADMIN — Medication SCH UNIT: at 08:36

## 2018-07-30 RX ADMIN — POTASSIUM CHLORIDE, DEXTROSE MONOHYDRATE AND SODIUM CHLORIDE SCH MLS/HR: 150; 5; 450 INJECTION, SOLUTION INTRAVENOUS at 11:00

## 2018-07-30 RX ADMIN — SOTALOL HYDROCHLORIDE SCH MG: 80 TABLET ORAL at 21:00

## 2018-07-30 RX ADMIN — ENOXAPARIN SODIUM SCH MG: 30 INJECTION SUBCUTANEOUS at 21:02

## 2018-07-30 RX ADMIN — ACETAMINOPHEN SCH MG: 325 TABLET ORAL at 08:36

## 2018-07-30 RX ADMIN — DEXTROSE SCH MLS/HR: 50 INJECTION, SOLUTION INTRAVENOUS at 21:00

## 2018-07-30 RX ADMIN — Medication SCH GM: at 08:36

## 2018-07-30 RX ADMIN — SOTALOL HYDROCHLORIDE SCH MG: 80 TABLET ORAL at 08:37

## 2018-07-30 RX ADMIN — DEXTROSE SCH MLS/HR: 50 INJECTION, SOLUTION INTRAVENOUS at 08:37

## 2018-07-31 VITALS — SYSTOLIC BLOOD PRESSURE: 117 MMHG

## 2018-07-31 VITALS — SYSTOLIC BLOOD PRESSURE: 158 MMHG

## 2018-07-31 VITALS — SYSTOLIC BLOOD PRESSURE: 124 MMHG

## 2018-07-31 RX ADMIN — Medication SCH MG: at 21:35

## 2018-07-31 RX ADMIN — Medication SCH UNIT: at 21:36

## 2018-07-31 RX ADMIN — Medication SCH GM: at 10:28

## 2018-07-31 RX ADMIN — POTASSIUM CHLORIDE, DEXTROSE MONOHYDRATE AND SODIUM CHLORIDE SCH MLS/HR: 150; 5; 450 INJECTION, SOLUTION INTRAVENOUS at 18:19

## 2018-07-31 RX ADMIN — Medication SCH UNIT: at 10:29

## 2018-07-31 RX ADMIN — ENOXAPARIN SODIUM SCH MG: 30 INJECTION SUBCUTANEOUS at 21:34

## 2018-07-31 RX ADMIN — SOTALOL HYDROCHLORIDE SCH MG: 80 TABLET ORAL at 21:35

## 2018-07-31 RX ADMIN — POTASSIUM CHLORIDE, DEXTROSE MONOHYDRATE AND SODIUM CHLORIDE SCH MLS/HR: 150; 5; 450 INJECTION, SOLUTION INTRAVENOUS at 04:43

## 2018-07-31 RX ADMIN — DEXTROSE SCH MLS/HR: 50 INJECTION, SOLUTION INTRAVENOUS at 21:31

## 2018-07-31 RX ADMIN — DEXTROSE SCH MLS/HR: 50 INJECTION, SOLUTION INTRAVENOUS at 10:26

## 2018-07-31 RX ADMIN — ACETAMINOPHEN SCH MG: 325 TABLET ORAL at 10:28

## 2018-07-31 RX ADMIN — FINASTERIDE SCH MG: 5 TABLET, FILM COATED ORAL at 10:26

## 2018-07-31 RX ADMIN — SOTALOL HYDROCHLORIDE SCH MG: 80 TABLET ORAL at 10:27

## 2018-08-01 VITALS — SYSTOLIC BLOOD PRESSURE: 121 MMHG

## 2018-08-01 VITALS — SYSTOLIC BLOOD PRESSURE: 144 MMHG

## 2018-08-01 VITALS — SYSTOLIC BLOOD PRESSURE: 108 MMHG

## 2018-08-01 VITALS — SYSTOLIC BLOOD PRESSURE: 135 MMHG

## 2018-08-01 LAB
ANION GAP SERPL CALCULATED.3IONS-SCNC: 3 MMOL/L (ref 5–15)
BASOPHILS # BLD AUTO: 0.1 K/UL (ref 0–0.2)
BASOPHILS NFR BLD AUTO: 0.9 % (ref 0–2)
BUN SERPL-MCNC: 14 MG/DL (ref 8–21)
CALCIUM SERPL-MCNC: 9.1 MG/DL (ref 8.4–11)
CHLORIDE SERPL-SCNC: 101 MMOL/L (ref 98–107)
CREAT SERPL-MCNC: 0.37 MG/DL (ref 0.55–1.3)
EOSINOPHIL # BLD AUTO: 0.6 K/UL (ref 0–0.4)
EOSINOPHIL NFR BLD AUTO: 6.6 % (ref 0–4)
ERYTHROCYTE [DISTWIDTH] IN BLOOD BY AUTOMATED COUNT: 15.6 % (ref 9–15)
GFR SERPL CREATININE-BSD FRML MDRD: (no result) ML/MIN (ref 90–?)
GLUCOSE SERPL-MCNC: 117 MG/DL (ref 70–99)
HCT VFR BLD AUTO: 28.7 % (ref 36–54)
HGB BLD-MCNC: 9.9 G/DL (ref 14–18)
LYMPHOCYTES # BLD AUTO: 1.8 K/UL (ref 1–5.5)
LYMPHOCYTES NFR BLD AUTO: 20.5 % (ref 20.5–51.5)
MCH RBC QN AUTO: 30 PG (ref 27–31)
MCHC RBC AUTO-ENTMCNC: 34 % (ref 32–36)
MCV RBC AUTO: 88 FL (ref 79–98)
MONOCYTES # BLD MANUAL: 0.7 K/UL (ref 0–1)
MONOCYTES # BLD MANUAL: 7.9 % (ref 1.7–9.3)
NEUTROPHILS # BLD AUTO: 5.5 K/UL (ref 1.8–7.7)
NEUTROPHILS NFR BLD AUTO: 64.1 % (ref 40–70)
PLATELET # BLD AUTO: 242 K/UL (ref 130–430)
POTASSIUM SERPL-SCNC: 4 MMOL/L (ref 3.5–5.1)
RBC # BLD AUTO: 3.26 MIL/UL (ref 4.2–6.2)
SODIUM SERPLBLD-SCNC: 132 MMOL/L (ref 136–145)
WBC # BLD AUTO: 8.7 K/UL (ref 4.8–10.8)

## 2018-08-01 RX ADMIN — SOTALOL HYDROCHLORIDE SCH MG: 80 TABLET ORAL at 10:01

## 2018-08-01 RX ADMIN — ACETAMINOPHEN SCH MG: 325 TABLET ORAL at 10:01

## 2018-08-01 RX ADMIN — POTASSIUM CHLORIDE, DEXTROSE MONOHYDRATE AND SODIUM CHLORIDE SCH MLS/HR: 150; 5; 450 INJECTION, SOLUTION INTRAVENOUS at 10:02

## 2018-08-01 RX ADMIN — Medication SCH GM: at 10:00

## 2018-08-01 RX ADMIN — Medication SCH UNIT: at 10:00

## 2018-08-01 RX ADMIN — Medication SCH MG: at 10:00

## 2018-08-01 RX ADMIN — FINASTERIDE SCH MG: 5 TABLET, FILM COATED ORAL at 10:00

## 2018-08-01 RX ADMIN — DEXTROSE SCH MLS/HR: 50 INJECTION, SOLUTION INTRAVENOUS at 09:59

## 2018-08-12 ENCOUNTER — HOSPITAL ENCOUNTER (INPATIENT)
Dept: HOSPITAL 4 - SED | Age: 78
LOS: 5 days | Discharge: NURSING, REHAB OR LONG-TERM CARE | DRG: 720 | End: 2018-08-17
Payer: MEDICAID

## 2018-08-12 VITALS — SYSTOLIC BLOOD PRESSURE: 93 MMHG

## 2018-08-12 VITALS — HEIGHT: 69 IN | BODY MASS INDEX: 17.18 KG/M2 | WEIGHT: 116 LBS

## 2018-08-12 DIAGNOSIS — E86.1: ICD-10-CM

## 2018-08-12 DIAGNOSIS — F02.80: ICD-10-CM

## 2018-08-12 DIAGNOSIS — I50.9: ICD-10-CM

## 2018-08-12 DIAGNOSIS — N40.0: ICD-10-CM

## 2018-08-12 DIAGNOSIS — F79: ICD-10-CM

## 2018-08-12 DIAGNOSIS — E86.0: ICD-10-CM

## 2018-08-12 DIAGNOSIS — J15.6: ICD-10-CM

## 2018-08-12 DIAGNOSIS — A41.9: Primary | ICD-10-CM

## 2018-08-12 DIAGNOSIS — Z74.01: ICD-10-CM

## 2018-08-12 DIAGNOSIS — R53.2: ICD-10-CM

## 2018-08-12 DIAGNOSIS — Z93.1: ICD-10-CM

## 2018-08-12 DIAGNOSIS — R65.21: ICD-10-CM

## 2018-08-12 DIAGNOSIS — J44.9: ICD-10-CM

## 2018-08-12 DIAGNOSIS — Z93.3: ICD-10-CM

## 2018-08-12 DIAGNOSIS — E83.52: ICD-10-CM

## 2018-08-12 DIAGNOSIS — E43: ICD-10-CM

## 2018-08-12 DIAGNOSIS — G93.41: ICD-10-CM

## 2018-08-12 DIAGNOSIS — J96.00: ICD-10-CM

## 2018-08-12 DIAGNOSIS — Z87.440: ICD-10-CM

## 2018-08-12 DIAGNOSIS — G30.9: ICD-10-CM

## 2018-08-12 DIAGNOSIS — I48.0: ICD-10-CM

## 2018-08-12 DIAGNOSIS — Z93.2: ICD-10-CM

## 2018-08-12 DIAGNOSIS — N31.9: ICD-10-CM

## 2018-08-12 DIAGNOSIS — L89.899: ICD-10-CM

## 2018-08-12 DIAGNOSIS — Z90.49: ICD-10-CM

## 2018-08-12 DIAGNOSIS — M47.812: ICD-10-CM

## 2018-08-12 DIAGNOSIS — J69.0: ICD-10-CM

## 2018-08-12 DIAGNOSIS — E87.0: ICD-10-CM

## 2018-08-12 DIAGNOSIS — G80.9: ICD-10-CM

## 2018-08-12 DIAGNOSIS — K21.9: ICD-10-CM

## 2018-08-12 DIAGNOSIS — N39.0: ICD-10-CM

## 2018-08-12 DIAGNOSIS — Z16.24: ICD-10-CM

## 2018-08-12 DIAGNOSIS — N17.0: ICD-10-CM

## 2018-08-12 DIAGNOSIS — I11.0: ICD-10-CM

## 2018-08-12 LAB
ALBUMIN SERPL BCP-MCNC: 2.7 G/DL (ref 3.4–4.8)
ALT SERPL W P-5'-P-CCNC: 31 U/L (ref 12–78)
ANION GAP SERPL CALCULATED.3IONS-SCNC: 8 MMOL/L (ref 5–15)
AST SERPL W P-5'-P-CCNC: 14 U/L (ref 10–37)
BASOPHILS NFR BLD MANUAL: 0 % (ref 0–2)
BILIRUB SERPL-MCNC: 0.3 MG/DL (ref 0–1)
BUN SERPL-MCNC: 140 MG/DL (ref 8–21)
CALCIUM SERPL-MCNC: 12 MG/DL (ref 8.4–11)
CHLORIDE SERPL-SCNC: 122 MMOL/L (ref 98–107)
CREAT SERPL-MCNC: 1.08 MG/DL (ref 0.55–1.3)
EOSINOPHIL NFR BLD MANUAL: 0 % (ref 0–7)
ERYTHROCYTE [DISTWIDTH] IN BLOOD BY AUTOMATED COUNT: 16.2 % (ref 9–15)
GFR SERPL CREATININE-BSD FRML MDRD: (no result) ML/MIN (ref 90–?)
GLUCOSE SERPL-MCNC: 188 MG/DL (ref 70–99)
HCT VFR BLD AUTO: 41.9 % (ref 36–54)
HGB BLD-MCNC: 14 G/DL (ref 14–18)
LYMPHOCYTES NFR BLD MANUAL: 4 % (ref 20–46)
MCH RBC QN AUTO: 30 PG (ref 27–31)
MCHC RBC AUTO-ENTMCNC: 34 % (ref 32–36)
MCV RBC AUTO: 89 FL (ref 79–98)
MONOCYTES # BLD MANUAL: 2 % (ref 0–11)
NEUTS BAND NFR BLD MANUAL: 1 % (ref 0–6)
PLATELET # BLD AUTO: 452 K/UL (ref 130–430)
POTASSIUM SERPL-SCNC: 5 MMOL/L (ref 3.5–5.1)
RBC # BLD AUTO: 4.74 MIL/UL (ref 4.2–6.2)
SODIUM SERPLBLD-SCNC: 152 MMOL/L (ref 136–145)
WBC # BLD AUTO: 25.4 K/UL (ref 4.8–10.8)

## 2018-08-12 PROCEDURE — A4409 OST SKN BARR CONVEX <=4 SQ I: HCPCS

## 2018-08-12 PROCEDURE — C1751 CATH, INF, PER/CENT/MIDLINE: HCPCS

## 2018-08-12 NOTE — NUR
#16 FR Chambers catheter with use of sterile technique. Immediate return of 0 cc 
urine noted.  Bedside drainage bag placed below level of bladder. Pt tolerated 
procedure well. Patient arrived with chambers in place, changed due to standard of 
practice prior to admission. Patient unable to toilet self.

## 2018-08-12 NOTE — NUR
Patient's code status is FULL CODE- Physician Orders for Life-Sustaining 
Treatment paperwork placed in chart.

## 2018-08-12 NOTE — NUR
Patient brought to ED via ALS [facility] from [facility]. Facility contacted EMS due to desaturation. SpO2 80% on scene. 
Patient placed on non-rebreather en route to facility. Patient non-verbal, 
tracks with eyes. SpO2 in ED 95% on 15L via non-rebreather. Presents with 24g 
to left forearm, G-tube, illeostomy and chambers catheter in place. Multiple 
wounds noted to sacrum. Muscle contractures to x 4 extremities. Patient skin 
hot to touch. Audible rales noted. ABD non distended. Will continue to monitor.

## 2018-08-12 NOTE — NUR
ABX administered. Sepsis documentation present in physician charting. Sepsis 
protocol initiated while in ED.

## 2018-08-12 NOTE — NUR
Placed in room 02  . Placed on cardiac monitor, blood pressure machine and 
pulse oximeter. To gown for exam. Side rails up. Report given to JOSELUIS Altman.

## 2018-08-12 NOTE — NUR
#22 gauge angiocath placed to right wrist.  Use of asceptic technique.  Opsite 
placed over site.  Blood return noted.  Blood for lab drawn from site.  Flushed 
with 10 cc of normal saline.  No evidence of infiltration noted.  Patient 
tolerated well.

## 2018-08-12 NOTE — NUR
Medication reconciliation completed with information provided by Amadeo Guillaume. 
Any prior medication reconciliation on file was reviewed and corrected.

## 2018-08-13 VITALS — SYSTOLIC BLOOD PRESSURE: 106 MMHG

## 2018-08-13 VITALS — SYSTOLIC BLOOD PRESSURE: 110 MMHG

## 2018-08-13 VITALS — SYSTOLIC BLOOD PRESSURE: 99 MMHG

## 2018-08-13 VITALS — SYSTOLIC BLOOD PRESSURE: 92 MMHG

## 2018-08-13 VITALS — SYSTOLIC BLOOD PRESSURE: 113 MMHG

## 2018-08-13 VITALS — SYSTOLIC BLOOD PRESSURE: 104 MMHG

## 2018-08-13 VITALS — SYSTOLIC BLOOD PRESSURE: 88 MMHG

## 2018-08-13 VITALS — SYSTOLIC BLOOD PRESSURE: 85 MMHG

## 2018-08-13 VITALS — SYSTOLIC BLOOD PRESSURE: 93 MMHG

## 2018-08-13 VITALS — SYSTOLIC BLOOD PRESSURE: 101 MMHG

## 2018-08-13 VITALS — SYSTOLIC BLOOD PRESSURE: 84 MMHG

## 2018-08-13 VITALS — SYSTOLIC BLOOD PRESSURE: 105 MMHG

## 2018-08-13 VITALS — SYSTOLIC BLOOD PRESSURE: 86 MMHG

## 2018-08-13 VITALS — SYSTOLIC BLOOD PRESSURE: 100 MMHG

## 2018-08-13 VITALS — SYSTOLIC BLOOD PRESSURE: 117 MMHG

## 2018-08-13 VITALS — SYSTOLIC BLOOD PRESSURE: 111 MMHG

## 2018-08-13 VITALS — SYSTOLIC BLOOD PRESSURE: 95 MMHG

## 2018-08-13 VITALS — SYSTOLIC BLOOD PRESSURE: 97 MMHG

## 2018-08-13 VITALS — SYSTOLIC BLOOD PRESSURE: 118 MMHG

## 2018-08-13 LAB
AMYLASE SERPL-CCNC: 57 U/L (ref 0–100)
ANION GAP SERPL CALCULATED.3IONS-SCNC: 6 MMOL/L (ref 5–15)
ANION GAP SERPL CALCULATED.3IONS-SCNC: 9 MMOL/L (ref 5–15)
APPEARANCE UR: CLEAR
BACTERIA URNS QL MICRO: (no result) /HPF
BASOPHILS NFR BLD MANUAL: 0 % (ref 0–2)
BILIRUB UR QL STRIP: NEGATIVE
BUN SERPL-MCNC: 113 MG/DL (ref 8–21)
BUN SERPL-MCNC: 96 MG/DL (ref 8–21)
CALCIUM SERPL-MCNC: 10.2 MG/DL (ref 8.4–11)
CALCIUM SERPL-MCNC: 10.4 MG/DL (ref 8.4–11)
CHLORIDE SERPL-SCNC: 128 MMOL/L (ref 98–107)
CHLORIDE SERPL-SCNC: 130 MMOL/L (ref 98–107)
COLOR UR: YELLOW
CREAT SERPL-MCNC: 0.81 MG/DL (ref 0.55–1.3)
CREAT SERPL-MCNC: 0.93 MG/DL (ref 0.55–1.3)
EOSINOPHIL NFR BLD MANUAL: 1 % (ref 0–7)
ERYTHROCYTE [DISTWIDTH] IN BLOOD BY AUTOMATED COUNT: 16.4 % (ref 9–15)
FINE GRAN CASTS URNS QL MICRO: (no result) /LPF
GFR SERPL CREATININE-BSD FRML MDRD: (no result) ML/MIN (ref 90–?)
GFR SERPL CREATININE-BSD FRML MDRD: (no result) ML/MIN (ref 90–?)
GLUCOSE SERPL-MCNC: 143 MG/DL (ref 70–99)
GLUCOSE SERPL-MCNC: 158 MG/DL (ref 70–99)
GLUCOSE UR STRIP-MCNC: NEGATIVE MG/DL
HCT VFR BLD AUTO: 34.6 % (ref 36–54)
HGB BLD-MCNC: 11.5 G/DL (ref 14–18)
HGB UR QL STRIP: (no result)
INR PPP: 1.1 (ref 0.8–1.2)
KETONES UR STRIP-MCNC: NEGATIVE MG/DL
LEUKOCYTE ESTERASE UR QL STRIP: (no result)
LIPASE SERPL-CCNC: 62 U/L (ref 73–393)
LYMPHOCYTES NFR BLD MANUAL: 2 % (ref 20–46)
MCH RBC QN AUTO: 30 PG (ref 27–31)
MCHC RBC AUTO-ENTMCNC: 33 % (ref 32–36)
MCV RBC AUTO: 91 FL (ref 79–98)
MONOCYTES # BLD MANUAL: 4 % (ref 0–11)
NEUTS BAND NFR BLD MANUAL: 17 % (ref 0–6)
NITRITE UR QL STRIP: NEGATIVE
PH UR STRIP: 5.5 [PH] (ref 5–8)
PHOSPHATE SERPL-MCNC: 2.1 MG/DL (ref 2.7–4.5)
PLATELET # BLD AUTO: 314 K/UL (ref 130–430)
POTASSIUM SERPL-SCNC: 3.7 MMOL/L (ref 3.5–5.1)
POTASSIUM SERPL-SCNC: 4.1 MMOL/L (ref 3.5–5.1)
PROT UR QL STRIP: (no result)
PROTHROMBIN TIME: 10.9 SECS (ref 9.5–12.5)
RBC # BLD AUTO: 3.82 MIL/UL (ref 4.2–6.2)
SODIUM SERPLBLD-SCNC: 155 MMOL/L (ref 136–145)
SODIUM SERPLBLD-SCNC: 155 MMOL/L (ref 136–145)
SP GR UR STRIP: 1.01 (ref 1–1.03)
T4 FREE SERPL-MCNC: 0.7 NG/DL (ref 0.6–1.6)
TSH SERPL DL<=0.05 MIU/L-ACNC: 0.18 UIU/ML (ref 0.34–4.82)
UROBILINOGEN UR STRIP-MCNC: 0.2 MG/DL (ref 0.2–1)
WBC # BLD AUTO: 26.5 K/UL (ref 4.8–10.8)

## 2018-08-13 PROCEDURE — 5A09457 ASSISTANCE WITH RESPIRATORY VENTILATION, 24-96 CONSECUTIVE HOURS, CONTINUOUS POSITIVE AIRWAY PRESSURE: ICD-10-PCS

## 2018-08-13 PROCEDURE — 02HV33Z INSERTION OF INFUSION DEVICE INTO SUPERIOR VENA CAVA, PERCUTANEOUS APPROACH: ICD-10-PCS

## 2018-08-13 RX ADMIN — Medication SCH UNIT: at 21:10

## 2018-08-13 RX ADMIN — IPRATROPIUM BROMIDE AND ALBUTEROL SULFATE SCH ML: .5; 3 SOLUTION RESPIRATORY (INHALATION) at 01:27

## 2018-08-13 RX ADMIN — IPRATROPIUM BROMIDE AND ALBUTEROL SULFATE SCH ML: .5; 3 SOLUTION RESPIRATORY (INHALATION) at 09:59

## 2018-08-13 RX ADMIN — IPRATROPIUM BROMIDE AND ALBUTEROL SULFATE SCH ML: .5; 3 SOLUTION RESPIRATORY (INHALATION) at 18:38

## 2018-08-13 RX ADMIN — METHYLPREDNISOLONE SODIUM SUCCINATE SCH MG: 125 INJECTION, POWDER, FOR SOLUTION INTRAMUSCULAR; INTRAVENOUS at 21:11

## 2018-08-13 RX ADMIN — METHYLPREDNISOLONE SODIUM SUCCINATE SCH MG: 125 INJECTION, POWDER, FOR SOLUTION INTRAMUSCULAR; INTRAVENOUS at 12:17

## 2018-08-13 RX ADMIN — DILTIAZEM HYDROCHLORIDE SCH MG: 30 TABLET, FILM COATED ORAL at 00:15

## 2018-08-13 RX ADMIN — SOTALOL HYDROCHLORIDE SCH MG: 80 TABLET ORAL at 09:00

## 2018-08-13 RX ADMIN — GENTAMICIN SULFATE SCH MLS/HR: 60 INJECTION, SOLUTION INTRAVENOUS at 13:53

## 2018-08-13 RX ADMIN — DILTIAZEM HYDROCHLORIDE SCH MG: 30 TABLET, FILM COATED ORAL at 08:15

## 2018-08-13 RX ADMIN — SOTALOL HYDROCHLORIDE SCH MG: 80 TABLET ORAL at 21:00

## 2018-08-13 RX ADMIN — NOREPINEPHRINE BITARTRATE PRN MLS/HR: 1 INJECTION, SOLUTION, CONCENTRATE INTRAVENOUS at 16:13

## 2018-08-13 RX ADMIN — Medication SCH MG: at 09:05

## 2018-08-13 RX ADMIN — SODIUM BICARBONATE SCH MLS/HR: 84 INJECTION, SOLUTION INTRAVENOUS at 09:10

## 2018-08-13 RX ADMIN — DILTIAZEM HYDROCHLORIDE SCH MG: 30 TABLET, FILM COATED ORAL at 15:48

## 2018-08-13 RX ADMIN — DEXTROSE MONOHYDRATE SCH MLS/HR: 50 INJECTION, SOLUTION INTRAVENOUS at 12:11

## 2018-08-13 RX ADMIN — SODIUM BICARBONATE SCH MLS/HR: 84 INJECTION, SOLUTION INTRAVENOUS at 18:09

## 2018-08-13 RX ADMIN — DOCUSATE SODIUM SCH MG: 100 CAPSULE, LIQUID FILLED ORAL at 09:05

## 2018-08-13 RX ADMIN — IPRATROPIUM BROMIDE AND ALBUTEROL SULFATE SCH ML: .5; 3 SOLUTION RESPIRATORY (INHALATION) at 13:10

## 2018-08-13 RX ADMIN — DEXTROSE MONOHYDRATE SCH MLS/HR: 50 INJECTION, SOLUTION INTRAVENOUS at 18:09

## 2018-08-13 RX ADMIN — DEXTROSE MONOHYDRATE SCH MLS/HR: 50 INJECTION, SOLUTION INTRAVENOUS at 05:24

## 2018-08-13 RX ADMIN — FLUCONAZOLE SCH MG: 100 TABLET ORAL at 09:05

## 2018-08-13 RX ADMIN — FINASTERIDE SCH MG: 5 TABLET, FILM COATED ORAL at 09:06

## 2018-08-13 RX ADMIN — DOCUSATE SODIUM SCH MG: 100 CAPSULE, LIQUID FILLED ORAL at 21:09

## 2018-08-13 RX ADMIN — Medication SCH UNIT: at 09:05

## 2018-08-13 NOTE — NUR
Consult MD: Nephro.

Dr. Resendiz called (dr. chan on call)

spoke to coni

dialed [phone]

Ordered by Dr. Emanuel

## 2018-08-13 NOTE — NUR
TUBEFEEDING/CHG/LATE MEDICATION/TUBING CHANGE



TUBEFEEDING STARTED AT RATE OF 45ML/HR PER MD ORDERS AND DIETARY. PATIENT HAS 0 RESIDUAL AT 
THIS TIME.



CHG BATH GIVEN, LINENS CHANGED, AND PATIENT GOWN CHANGED. PATIENT TOLERATED.



PATIENT MEDICATION GIVEN LATE DUE TO PATIENT BATHING, AND PICC PLACEMENT AND VERIFICATION.



ALL TUBING CHANGED AFTER STARTING WITH PICC LINE.

## 2018-08-13 NOTE — NUR
ERICA MALDONADO AT BEDSIDE, MADE AWARE OF CRITICAL VALUES. SHE WILL MAKE CHANGES AS NECESSARY. WILL 
FOLLOW UP REGARDING ORDERS.

## 2018-08-13 NOTE — NUR
Nutrition Update



Clifford Scale 10 noted.

Pt admitted for pneumonia

Diet: regular diet

BMI: 17.1 kg/m2

RD to follow per nutrition care standards.

## 2018-08-13 NOTE — NUR
ERICA

PAGED DR. MALDONADO CRITICAL VALUE FOR CHLORIDE . INFORMED OF SODIUM LEVEL, POTASSIUM 
LEVEL, AND BUN AS WELL. 



NO CHANGES MADE AT THIS TIME. NO NEW ORDERS AT THIS TIME.

## 2018-08-13 NOTE — NUR
NOTE

PATIENT RESTING COMFORTABLY. NEEDS ARE MET AT THIS TIME. PATIENT AGEE CATHETER DRAINING TO 
GRAVITY. TF RUNNING PER MD ORDERS AT RATE OF 45ML/HR. PATIENT HAS IVF RUNNING PER MD ORDERS 
AT RATE OF 100ML/HR. PATIENTS AGEE DRAINING TO GRAVITY. ILEOSTOMY IS FREE OF LEAKS, 
DRAINING WELL. PATIENTS BIPAP SETTINGS ARE 12/6; FI02 40%; AND BACK UP RATE OF 10. WILL 
CONTINUE TO FOLLOW UP AND MONITOR PATIENT FOR CHANGES IN STATUS.

## 2018-08-13 NOTE — NUR
RN Rounds



Patient VSS. Levophed @ 2mcg/min. IVF infusing @ 100mls/hr. No apparent distress noted. Will 
continue to monitor.

## 2018-08-13 NOTE — NUR
ARRIVAL TO ICU



PT ARRIVES TO ICU. NO SIGNS OF DISTRESS, NO APPARENT PAIN OR DISCOMFORT. PLACED ON BIPAP 
12/6, BUR 20, FIO2 40%. ADMISSION TO BE COMPLETED.

## 2018-08-13 NOTE — NUR
EVE BAXTER AWARE OF DECREASING BLOOD PRESSURE AND MAP OF 66. ORDERS RECEIVED FOR LEVOPHED TO 
KEEP SBP >90. WILL IMPLEMENT AS NECESSARY. ORDERS ENTERED BY NURSE.

## 2018-08-13 NOTE — NUR
NOTE

JAK CHONG RETURNED CALL, INFORMED OF PICC PLACEMENT AND OKAY BY PHYSICIAN DUE TO MEDICALLY 
NECESSITY.

## 2018-08-13 NOTE — NUR
Dietitian Recommendations 

*Recommend Pivot 1.5 at 55ml/hr (goal),  Q8H via GT.

Provides: 1980 kcal, 124 gm protein and 1752ml free water daily.

Meets: 93% of upper end of estimated calorie needs and 85% of upper end of 

estimated protein needs.



Please see Nutritional Assessment for details.

BILL, RD

## 2018-08-13 NOTE — NUR
Opening Note



Received patient A/O x 1. Patient on BIPAP 12/6, FIO2 40%, back up rate of 20. PATRICK PICC 
infusing Levophed @ 2mcg/min. D5W @ 100 mls/hr, dressing dry and intact. Ileostomy to R 
Upper ABD noted. Pt on tubefeeding, Pivot 1.5 @ mls/hr via Gtube, no residual noted. Patient 
on chambers, urine draining to gravity. Bilateral SCDs in place. POC discussed. HOB elevated, 
call light within reach. Bed locked, in lowest position. Safety and aspiration precautions 
in place. No apparent discomfort or distress noted. Will continue to monitor.

## 2018-08-13 NOTE — NUR
NOTE

DR. BAXTER SIGNED CONSENT FOR PICC PLACEMENT, MEDICALLY NEEDED. WILL INFORM JAK UPON RETURN 
CALL.

## 2018-08-13 NOTE — NUR
LEVOPHED

LEVOPHED STARTED AS PATIENTS BLOOD PRESSURE 86/49 WITH MAP OF 61. STARTED AT 2MCG/MIN PER 
PROTOCOL. WILL CONTINUE TO FOLLOW UP AND MONITOR.

## 2018-08-13 NOTE — NUR
OPENING NOTE

PATIENT REPORT RECEIVED FROM NIGHT SHIFT NURSEALYCE. PATIENT RESTING COMFORTABLY, NO 
INDICATIONS OF PAIN, NO NOTABLE SIGNS OF DISTRESS AT THIS TIME. PATIENT ON BIPAP 12/6 FI02 
40% AND BACK UP RATE AT 20. PATIENT IS SINUS TACHY ON THE MONITOR WITH PAC'S. PATIENT HAS 
IVF RUNNING AT 60ML/HR. PATIENT HAS AGEE CATHETER DRAINING TO GRAVITY. PATIENT REPOSITIONED 
UPON PILLOW SUPPORT. PATIENT TO HAVE PICC LINE PLACED TODAY. PATIENT HAS ILEOSTOMY. PATIENT 
IS NON VERBAL, AND WILL OPEN EYE BUT NOT FOLLOW WITH MOVEMENT. PATIENT HAS SCDS BILATERALLY 
FOR DVT PROPHYLAXIS. WILL CONTINUE TO FOLLOW UP AND MONITOR PATIENT FOR CHANGES IN STATUS.

## 2018-08-13 NOTE — NUR
PAGED

I PAGED DR. KHAN @[phone]

I SPOKE WITH PETE BRADFORD

THIS IS THE THIRD CALL THIS TIME DR. MART @ [phone]

## 2018-08-13 NOTE — NUR
PUT PT ON BIPAP AT AROUND 2250

SETTINGS: 12/6, BUR 20, PS 6, FIO2 100% PER DR NIOCLE ORDER.



AT AROUND 2355 TITRATED PT FIO2 TO 80% AND BRITTANY ABG.



PER ABG RESULTS AFTER CHANGES AROUND 0005 . (SEE LAB REPORT) 

SWITCHED PT FIO2 AGAIN TO 40% 



NOW PT GOT TRANSPORTED TO ICU 2.

WILL CONTINUE TO MONITOR PT.

## 2018-08-13 NOTE — NUR
Patient will be admitted to care of Dr. Emanuel.  Admitted to ICU.  Will go to 
room 2.  Belongings list completed.  Summary report printed. Report will be 
given at bedside. Transfer to ICU via ACLS protocol. Licensed nurse present. IV 
present no signs or symptoms of infiltration.

## 2018-08-14 VITALS — SYSTOLIC BLOOD PRESSURE: 99 MMHG

## 2018-08-14 VITALS — SYSTOLIC BLOOD PRESSURE: 106 MMHG

## 2018-08-14 VITALS — SYSTOLIC BLOOD PRESSURE: 94 MMHG

## 2018-08-14 VITALS — SYSTOLIC BLOOD PRESSURE: 113 MMHG

## 2018-08-14 VITALS — SYSTOLIC BLOOD PRESSURE: 100 MMHG

## 2018-08-14 VITALS — SYSTOLIC BLOOD PRESSURE: 127 MMHG

## 2018-08-14 VITALS — SYSTOLIC BLOOD PRESSURE: 120 MMHG

## 2018-08-14 VITALS — SYSTOLIC BLOOD PRESSURE: 111 MMHG

## 2018-08-14 VITALS — SYSTOLIC BLOOD PRESSURE: 110 MMHG

## 2018-08-14 VITALS — SYSTOLIC BLOOD PRESSURE: 96 MMHG

## 2018-08-14 VITALS — SYSTOLIC BLOOD PRESSURE: 103 MMHG

## 2018-08-14 VITALS — SYSTOLIC BLOOD PRESSURE: 90 MMHG

## 2018-08-14 VITALS — SYSTOLIC BLOOD PRESSURE: 104 MMHG

## 2018-08-14 VITALS — SYSTOLIC BLOOD PRESSURE: 126 MMHG

## 2018-08-14 VITALS — SYSTOLIC BLOOD PRESSURE: 109 MMHG

## 2018-08-14 VITALS — SYSTOLIC BLOOD PRESSURE: 145 MMHG

## 2018-08-14 VITALS — SYSTOLIC BLOOD PRESSURE: 105 MMHG

## 2018-08-14 VITALS — SYSTOLIC BLOOD PRESSURE: 121 MMHG

## 2018-08-14 VITALS — SYSTOLIC BLOOD PRESSURE: 95 MMHG

## 2018-08-14 LAB
ALBUMIN SERPL BCP-MCNC: 1.8 G/DL (ref 3.4–4.8)
ALT SERPL W P-5'-P-CCNC: 27 U/L (ref 12–78)
ANION GAP SERPL CALCULATED.3IONS-SCNC: 8 MMOL/L (ref 5–15)
AST SERPL W P-5'-P-CCNC: 14 U/L (ref 10–37)
BASOPHILS # BLD AUTO: 0 K/UL (ref 0–0.2)
BASOPHILS NFR BLD AUTO: 0.2 % (ref 0–2)
BILIRUB SERPL-MCNC: 0.2 MG/DL (ref 0–1)
BUN SERPL-MCNC: 77 MG/DL (ref 8–21)
CALCIUM SERPL-MCNC: 10.4 MG/DL (ref 8.4–11)
CHLORIDE SERPL-SCNC: 125 MMOL/L (ref 98–107)
CREAT SERPL-MCNC: 0.72 MG/DL (ref 0.55–1.3)
EOSINOPHIL # BLD AUTO: 0 K/UL (ref 0–0.4)
EOSINOPHIL NFR BLD AUTO: 0.1 % (ref 0–4)
ERYTHROCYTE [DISTWIDTH] IN BLOOD BY AUTOMATED COUNT: 16.5 % (ref 9–15)
GFR SERPL CREATININE-BSD FRML MDRD: (no result) ML/MIN (ref 90–?)
GLUCOSE SERPL-MCNC: 231 MG/DL (ref 70–99)
HBA1C MFR BLD: 5 % (ref 4.8–5.6)
HCT VFR BLD AUTO: 29.6 % (ref 36–54)
HGB BLD-MCNC: 10 G/DL (ref 14–18)
LYMPHOCYTES # BLD AUTO: 0.7 K/UL (ref 1–5.5)
LYMPHOCYTES NFR BLD AUTO: 5.4 % (ref 20.5–51.5)
MCH RBC QN AUTO: 31 PG (ref 27–31)
MCHC RBC AUTO-ENTMCNC: 34 % (ref 32–36)
MCV RBC AUTO: 90 FL (ref 79–98)
MONOCYTES # BLD MANUAL: 0.1 K/UL (ref 0–1)
MONOCYTES # BLD MANUAL: 0.8 % (ref 1.7–9.3)
NEUTROPHILS # BLD AUTO: 11.5 K/UL (ref 1.8–7.7)
NEUTROPHILS NFR BLD AUTO: 93.5 % (ref 40–70)
PHOSPHATE SERPL-MCNC: 2.7 MG/DL (ref 2.7–4.5)
PLATELET # BLD AUTO: 237 K/UL (ref 130–430)
POTASSIUM SERPL-SCNC: 3.9 MMOL/L (ref 3.5–5.1)
RBC # BLD AUTO: 3.29 MIL/UL (ref 4.2–6.2)
SODIUM SERPLBLD-SCNC: 151 MMOL/L (ref 136–145)
T3 UPTAKE: 33 % (ref 24–39)
T4 SERPL-MCNC: 6.1 UG/DL (ref 4.5–12)
WBC # BLD AUTO: 12.3 K/UL (ref 4.8–10.8)

## 2018-08-14 RX ADMIN — SODIUM BICARBONATE SCH MLS/HR: 84 INJECTION, SOLUTION INTRAVENOUS at 23:23

## 2018-08-14 RX ADMIN — IPRATROPIUM BROMIDE AND ALBUTEROL SULFATE SCH ML: .5; 3 SOLUTION RESPIRATORY (INHALATION) at 13:04

## 2018-08-14 RX ADMIN — METHYLPREDNISOLONE SODIUM SUCCINATE SCH MG: 125 INJECTION, POWDER, FOR SOLUTION INTRAMUSCULAR; INTRAVENOUS at 05:18

## 2018-08-14 RX ADMIN — DEXTROSE MONOHYDRATE SCH MLS/HR: 50 INJECTION, SOLUTION INTRAVENOUS at 17:07

## 2018-08-14 RX ADMIN — Medication SCH MG: at 08:10

## 2018-08-14 RX ADMIN — SOTALOL HYDROCHLORIDE SCH MG: 80 TABLET ORAL at 08:11

## 2018-08-14 RX ADMIN — METHYLPREDNISOLONE SODIUM SUCCINATE SCH MG: 125 INJECTION, POWDER, FOR SOLUTION INTRAMUSCULAR; INTRAVENOUS at 13:06

## 2018-08-14 RX ADMIN — IPRATROPIUM BROMIDE AND ALBUTEROL SULFATE SCH ML: .5; 3 SOLUTION RESPIRATORY (INHALATION) at 07:48

## 2018-08-14 RX ADMIN — NOREPINEPHRINE BITARTRATE PRN MLS/HR: 1 INJECTION, SOLUTION, CONCENTRATE INTRAVENOUS at 11:01

## 2018-08-14 RX ADMIN — METHYLPREDNISOLONE SODIUM SUCCINATE SCH MG: 125 INJECTION, POWDER, FOR SOLUTION INTRAMUSCULAR; INTRAVENOUS at 21:13

## 2018-08-14 RX ADMIN — FLUCONAZOLE SCH MG: 100 TABLET ORAL at 08:12

## 2018-08-14 RX ADMIN — Medication SCH UNIT: at 21:14

## 2018-08-14 RX ADMIN — Medication SCH UNIT: at 08:11

## 2018-08-14 RX ADMIN — SOTALOL HYDROCHLORIDE SCH MG: 80 TABLET ORAL at 21:00

## 2018-08-14 RX ADMIN — DEXTROSE MONOHYDRATE SCH MLS/HR: 50 INJECTION, SOLUTION INTRAVENOUS at 11:00

## 2018-08-14 RX ADMIN — SODIUM BICARBONATE SCH MLS/HR: 84 INJECTION, SOLUTION INTRAVENOUS at 13:07

## 2018-08-14 RX ADMIN — DEXTROSE MONOHYDRATE SCH MLS/HR: 50 INJECTION, SOLUTION INTRAVENOUS at 23:23

## 2018-08-14 RX ADMIN — DILTIAZEM HYDROCHLORIDE SCH MG: 30 TABLET, FILM COATED ORAL at 15:47

## 2018-08-14 RX ADMIN — DOCUSATE SODIUM SCH MG: 100 CAPSULE, LIQUID FILLED ORAL at 08:11

## 2018-08-14 RX ADMIN — DOCUSATE SODIUM SCH MG: 100 CAPSULE, LIQUID FILLED ORAL at 21:13

## 2018-08-14 RX ADMIN — DEXTROSE MONOHYDRATE SCH MLS/HR: 50 INJECTION, SOLUTION INTRAVENOUS at 00:22

## 2018-08-14 RX ADMIN — DILTIAZEM HYDROCHLORIDE SCH MG: 30 TABLET, FILM COATED ORAL at 00:15

## 2018-08-14 RX ADMIN — IPRATROPIUM BROMIDE AND ALBUTEROL SULFATE SCH ML: .5; 3 SOLUTION RESPIRATORY (INHALATION) at 00:55

## 2018-08-14 RX ADMIN — IPRATROPIUM BROMIDE AND ALBUTEROL SULFATE SCH ML: .5; 3 SOLUTION RESPIRATORY (INHALATION) at 19:36

## 2018-08-14 RX ADMIN — DEXTROSE MONOHYDRATE SCH MLS/HR: 50 INJECTION, SOLUTION INTRAVENOUS at 05:18

## 2018-08-14 RX ADMIN — SODIUM BICARBONATE SCH MLS/HR: 84 INJECTION, SOLUTION INTRAVENOUS at 04:41

## 2018-08-14 RX ADMIN — DILTIAZEM HYDROCHLORIDE SCH MG: 30 TABLET, FILM COATED ORAL at 23:24

## 2018-08-14 RX ADMIN — FINASTERIDE SCH MG: 5 TABLET, FILM COATED ORAL at 08:10

## 2018-08-14 RX ADMIN — GENTAMICIN SULFATE SCH MLS/HR: 60 INJECTION, SOLUTION INTRAVENOUS at 13:06

## 2018-08-14 RX ADMIN — DILTIAZEM HYDROCHLORIDE SCH MG: 30 TABLET, FILM COATED ORAL at 08:12

## 2018-08-14 NOTE — NUR
MD ROUNDS

DR BAXTER HERE TO EVALUATE PT. MD AWARE OF AM LABS/ABG. RECEIVED ORDERS FOR RT TO PLACE PT ON 
NC AS TOLERATED.

## 2018-08-14 NOTE — NUR
RT NOTES

Per LAB, earlier sputum sample was contaminated. Re-collected, w/c pt. tolerated well. 
Endorsed sample to JOSELUIS Santiago.

-------------------------------------------------------------------------------

Addendum: 08/14/18 at 1340 by Micaela Kirk RT

-------------------------------------------------------------------------------

Amended: Links added.

## 2018-08-14 NOTE — NUR
RN Rounds



RT at bedside to give breathing treatment. Patient VSS. No apparent distress noted. Will 
continue to monitor.

## 2018-08-14 NOTE — NUR
RECEIVED REPORT FROM JESSICA HUGGINS. PT IN BED WITH EYES CLOSED, MOVES TO TACTILE STIMULATION. O2 
% ON BIPAP 12/5, 40% FIO2, BUR 20. UNLABORED RESPIRATIONS. SR ON MONITOR. GT WITH 
PIVOT TUBE FEEDING INFUSING AT 45ML/HR. ILEOSTOMY NOTED. AGEE DRAINING URINE TO GRAVITY. 
SCDs IN USE. PATRICK PICC WITH IVF INFUSING AND LEVOPHED AT 2MCG/MIN. HOB ELEVATED, BED LOCKED 
AND IN LOWEST POSITION, CALL LIGHT IN REACH. WILL CONTINUE TO MONITOR.

## 2018-08-14 NOTE — NUR
MD ROUNDS

DR RANGEL HERE TO EVALUATE PT. MD AWARE OF AM LABS. RECEIVED ORDERS FOR CONSULT WITH DR SHARPE AND TOMORROW AM LABS.

## 2018-08-14 NOTE — NUR
RT PLACED PT ON 2LNC AS PER ORDERS BY DR BAXTER. O2 SAT 98% AT THIS TIME, UNLABORED 
RESPIRATIONS. WILL CONTINUE TO MONITOR.

## 2018-08-14 NOTE — NUR
WOUND CARE/CHG

WOUND CARE COMPLETED WITH NEGAR WOUND CARE RN. CHG BATH GIVEN, LINEN CHANGED. PT TOLERATED 
WELL. WILL CONTINUE TO MONITOR.

## 2018-08-14 NOTE — NUR
RT NOTES

Took pt off bipap and placed on 2L NC per Dr Parra's order. No immediate adverse reactions 
noted. 0920 sat. 98% H.R. 68 RR 22 exhaled CO2 25. No respiratory distress noted. Will 
monitor pt.

## 2018-08-14 NOTE — NUR
Report

Received report from JOSELUIS Santiago using SBAR. Patient comfortable in bed WITH EYES CLOSED, 
MOVES TO TACTILE STIMULATION. O2 2L/Min N/C Saturating 98% by pulse oximeter. Respiration 
UNLABORED. Monitor SR. B/P 94/40. GT WITH PIVOT TUBE FEEDING INFUSING AT 45ML/HR. ILEOSTOMY 
NOTED. AGEE TO GRAVITY draining light yellow urine. RUQ Ostomy with liquidy stool. PATRICK PICC 
WITH IVF INFUSING D5W at 100 cc/hr. HOB ELEVATED, BED LOCKED AND IN LOWEST POSITION, CALL 
LIGHT IN REACH. Supportive measures given. WILL CONTINUE TO MONITOR.

## 2018-08-14 NOTE — NUR
RN Rounds



Patient VSS. Levophed @ 2mcg/min. IVF infusing @ 100mls/hr. Tubefeeding tolerated well. No 
apparent distress noted. Will continue to monitor.

## 2018-08-14 NOTE — NUR
WOUND EVALUATION:



Late note for 1403 secondary to patient care.



Wound Consult received from Dr. Emanuel. Thank you, Dr. Emanuel, for the consult.



Patient received in a John Bed with an Isoflex MICHAEL mattress with low air loss therapy, 
awake, nonverbal, nonresponsive to verbal commands. Patient is unable to turn in bed 
independently. Clifford Score is a 10. Past Medical History: Intellectually Impaired, G-tube, 
Dementia, benign prostatic hypertrophy, hypertension, and dysphagia. Recent Labs: WBC 12.3, 
RBC 3.29, hemoglobin 10.0, hematocrit 29.6, sodium 151, chloride 125, BUN 77, creatinine 
0.72, glucose 231, alkaline phosphatase 123, albumin 1.8, PTT 25.1. Intrinsic factors that 
delay wound healing: Severe Hypoalbuminemia. Extrinsic factors that delay wound healing: 
Immobility. Microbiology: Urine culture results in progress. MRSA screen results negative. 
Blood culture results 2 in progress. Sputum culture results in progress. Patient was on 
Levophed drip.





Wound Assessment:



1. Right Sacral/Coccygeal Area:

Unstageable pressure ulcer, present on admission. Wound bed has 40% pink tissue, 30% yellow 
slough, 20% black tissue, 10% brown tissue (on right lateral aspect). No odor, no drainage. 
Periwound (superior aspect) has dark discolored tissue. Surrounding tissue has scar tissue. 
Wound measures 3.5 cm x 7.7 cm.



Recommend: Cleanse wound with normal saline. Place moisture barrier cream onto evan-wound. 
Apply Venelex ointment onto wound bed. Cover with Sacral foam dressing. Perform wound care 
daily, and as needed for dressing soiling or dislodgement.





2. Scrotum:

Erythema from IAD, present on admission. No odor, no drainage. Surrounding tissue has scar 
tissue. 



Recommend: Cleanse site with normal saline. Pat dry. Apply antifungal powder to site. 
Perform site care bid, and as needed for soiling.





3. Right Medial Malleolus:

Scar tissue with dark discoloration, present on admission. 



Recommend: Cover site with foam dressing. Perform site care daily, and as needed for 
dressing soiling or dislodgement. Offload elevate and float foot with one pillow lengthwise 
at all times. Do not allow foot or malleoli to touch bed or other surfaces at any time.





Also recommend: Reposition patient side to side only every 2 hours with pillow support, and 
off-load pressure areas with pillows for pressure re-distribution. Offload, elevate and 
float bilateral heels with one pillow lengthwise under each extremity at all times. Perform 
skin care and monitor skin integrity Q shift. Use moisture barrier cream on buttocks and 
other moisture susceptible areas QID and as needed for soiling. Apply Phytoplex Criticare 
cream (blue and white tube) onto Scrotal area qid, and as needed for soiling. Maintain 
patient on a low air-loss mattress.

## 2018-08-15 VITALS — SYSTOLIC BLOOD PRESSURE: 104 MMHG

## 2018-08-15 VITALS — SYSTOLIC BLOOD PRESSURE: 98 MMHG

## 2018-08-15 VITALS — SYSTOLIC BLOOD PRESSURE: 89 MMHG

## 2018-08-15 VITALS — SYSTOLIC BLOOD PRESSURE: 106 MMHG

## 2018-08-15 VITALS — SYSTOLIC BLOOD PRESSURE: 88 MMHG

## 2018-08-15 VITALS — SYSTOLIC BLOOD PRESSURE: 101 MMHG

## 2018-08-15 VITALS — SYSTOLIC BLOOD PRESSURE: 111 MMHG

## 2018-08-15 VITALS — SYSTOLIC BLOOD PRESSURE: 100 MMHG

## 2018-08-15 VITALS — SYSTOLIC BLOOD PRESSURE: 94 MMHG

## 2018-08-15 VITALS — SYSTOLIC BLOOD PRESSURE: 99 MMHG

## 2018-08-15 VITALS — SYSTOLIC BLOOD PRESSURE: 102 MMHG

## 2018-08-15 LAB
ALBUMIN SERPL BCP-MCNC: 1.7 G/DL (ref 3.4–4.8)
ALT SERPL W P-5'-P-CCNC: 28 U/L (ref 12–78)
ANION GAP SERPL CALCULATED.3IONS-SCNC: 8 MMOL/L (ref 5–15)
AST SERPL W P-5'-P-CCNC: 16 U/L (ref 10–37)
BASOPHILS # BLD AUTO: 0 K/UL (ref 0–0.2)
BASOPHILS NFR BLD AUTO: 0.1 % (ref 0–2)
BILIRUB SERPL-MCNC: 0.2 MG/DL (ref 0–1)
BUN SERPL-MCNC: 58 MG/DL (ref 8–21)
CALCIUM SERPL-MCNC: 9.9 MG/DL (ref 8.4–11)
CHLORIDE SERPL-SCNC: 120 MMOL/L (ref 98–107)
CREAT SERPL-MCNC: 0.57 MG/DL (ref 0.55–1.3)
EOSINOPHIL # BLD AUTO: 0 K/UL (ref 0–0.4)
EOSINOPHIL NFR BLD AUTO: 0.1 % (ref 0–4)
ERYTHROCYTE [DISTWIDTH] IN BLOOD BY AUTOMATED COUNT: 16 % (ref 9–15)
GFR SERPL CREATININE-BSD FRML MDRD: (no result) ML/MIN (ref 90–?)
GLUCOSE SERPL-MCNC: 182 MG/DL (ref 70–99)
HCT VFR BLD AUTO: 27.4 % (ref 36–54)
HGB BLD-MCNC: 9.1 G/DL (ref 14–18)
LYMPHOCYTES # BLD AUTO: 0.6 K/UL (ref 1–5.5)
LYMPHOCYTES NFR BLD AUTO: 6.6 % (ref 20.5–51.5)
MCH RBC QN AUTO: 30 PG (ref 27–31)
MCHC RBC AUTO-ENTMCNC: 33 % (ref 32–36)
MCV RBC AUTO: 89 FL (ref 79–98)
MONOCYTES # BLD MANUAL: 0.2 K/UL (ref 0–1)
MONOCYTES # BLD MANUAL: 2.1 % (ref 1.7–9.3)
NEUTROPHILS # BLD AUTO: 8.9 K/UL (ref 1.8–7.7)
NEUTROPHILS NFR BLD AUTO: 91.1 % (ref 40–70)
PLATELET # BLD AUTO: 212 K/UL (ref 130–430)
POTASSIUM SERPL-SCNC: 3.5 MMOL/L (ref 3.5–5.1)
RBC # BLD AUTO: 3.09 MIL/UL (ref 4.2–6.2)
SODIUM SERPLBLD-SCNC: 149 MMOL/L (ref 136–145)
WBC # BLD AUTO: 9.8 K/UL (ref 4.8–10.8)

## 2018-08-15 RX ADMIN — CASTOR OIL AND BALSAM, PERU SCH GM: 788; 87 OINTMENT TOPICAL at 08:24

## 2018-08-15 RX ADMIN — FLUCONAZOLE SCH MG: 100 TABLET ORAL at 08:26

## 2018-08-15 RX ADMIN — DEXTROSE MONOHYDRATE SCH MLS/HR: 50 INJECTION, SOLUTION INTRAVENOUS at 11:06

## 2018-08-15 RX ADMIN — GENTAMICIN SULFATE SCH MLS/HR: 60 INJECTION, SOLUTION INTRAVENOUS at 13:05

## 2018-08-15 RX ADMIN — DOCUSATE SODIUM SCH MG: 100 CAPSULE, LIQUID FILLED ORAL at 20:59

## 2018-08-15 RX ADMIN — IPRATROPIUM BROMIDE AND ALBUTEROL SULFATE SCH ML: .5; 3 SOLUTION RESPIRATORY (INHALATION) at 13:29

## 2018-08-15 RX ADMIN — Medication SCH UNIT: at 20:59

## 2018-08-15 RX ADMIN — SOTALOL HYDROCHLORIDE SCH MG: 80 TABLET ORAL at 21:00

## 2018-08-15 RX ADMIN — DEXTROSE MONOHYDRATE SCH MLS/HR: 50 INJECTION, SOLUTION INTRAVENOUS at 17:40

## 2018-08-15 RX ADMIN — Medication SCH MG: at 08:24

## 2018-08-15 RX ADMIN — Medication SCH UNIT: at 08:26

## 2018-08-15 RX ADMIN — SODIUM BICARBONATE SCH MLS/HR: 84 INJECTION, SOLUTION INTRAVENOUS at 11:07

## 2018-08-15 RX ADMIN — IPRATROPIUM BROMIDE AND ALBUTEROL SULFATE SCH ML: .5; 3 SOLUTION RESPIRATORY (INHALATION) at 20:21

## 2018-08-15 RX ADMIN — DILTIAZEM HYDROCHLORIDE SCH MG: 30 TABLET, FILM COATED ORAL at 08:15

## 2018-08-15 RX ADMIN — DILTIAZEM HYDROCHLORIDE SCH MG: 30 TABLET, FILM COATED ORAL at 16:15

## 2018-08-15 RX ADMIN — DOCUSATE SODIUM SCH MG: 100 CAPSULE, LIQUID FILLED ORAL at 08:26

## 2018-08-15 RX ADMIN — IPRATROPIUM BROMIDE AND ALBUTEROL SULFATE SCH ML: .5; 3 SOLUTION RESPIRATORY (INHALATION) at 07:08

## 2018-08-15 RX ADMIN — IPRATROPIUM BROMIDE AND ALBUTEROL SULFATE SCH ML: .5; 3 SOLUTION RESPIRATORY (INHALATION) at 01:03

## 2018-08-15 RX ADMIN — FINASTERIDE SCH MG: 5 TABLET, FILM COATED ORAL at 08:24

## 2018-08-15 RX ADMIN — SOTALOL HYDROCHLORIDE SCH MG: 80 TABLET ORAL at 08:25

## 2018-08-15 RX ADMIN — DEXTROSE MONOHYDRATE SCH MLS/HR: 50 INJECTION, SOLUTION INTRAVENOUS at 06:07

## 2018-08-15 RX ADMIN — METHYLPREDNISOLONE SODIUM SUCCINATE SCH MG: 125 INJECTION, POWDER, FOR SOLUTION INTRAMUSCULAR; INTRAVENOUS at 06:08

## 2018-08-15 NOTE — NUR
RN rounds

patient resting in bed, eyes closed, breathing is even and unlabored, no signs of distress, 
turned and repositioned the patient with assistance from CNA, no residual output from G tube 
at this time, water flush given per MD orders, no other needs at this time, bed in lowest 
position, three side rails up, bed alarm on, fall, aspiration and isolation precautions in 
place.

## 2018-08-15 NOTE — NUR
RECEIVED ORDERS TO TRANSFER PT TO TELE, CALLED FOR BED. SPOKE TO CHRIS HUGGINS AND GAVE REPORT. 
AWAITING RETURN CALL FOR TELE BED AND RN.

## 2018-08-15 NOTE — NUR
Condition unchanged. Complete bath and linen change done. Tolerated procedure without 
distress noted.

## 2018-08-15 NOTE — NUR
OPENING NOTE



Received report from day shift nurse JOSELUIS Sands. Patient resting in bed awake, oriented x1. 
Breathing unlabored and even on 2L NC. Tolerating well. No signs of distress, no needs at 
this time. Fall, safety, aspiration, contact precautions in place. Bed in lowest position, 
brake on, alarm on, call light within reach. Tube feeding infusing as ordered. IVF infusing 
as ordered. SCDs on. Will continue to monitor.

## 2018-08-15 NOTE — NUR
MD ROUNDS

DR BAXTER HERE TO EVALUATE PT. MD AWARE OF AM LABS/ABG. MD CHANGED GT H2O FLUSH TO 200ML 
Q4HR.

## 2018-08-15 NOTE — NUR
Patient resting in bed with eyes closed. Breathing unlabored and even on 2L NC. Tolerating 
well. No signs of distress, no needs at this time. Fall, safety, aspiration, contact 
precautions in place. Bed in lowest position, brake on, alarm on, call light within reach. 
Tube feeding infusing as ordered. IVF infusing as ordered. SCDs on. Will continue to 
monitor.

## 2018-08-15 NOTE — NUR
Patient received

a/ox1, nonverbal, opens eyes does not track, on 2L Nasal Cannula, patient cannot ambulate, 
all extremities contracted, vital signs stable, PICC line to right upper arm intact and 
flushing well, G tube in place, tube feeding running, no residual output at this time, 
ileostomy to right lower quadrant draining soft brown stool at this time, Beckman Catheter in 
place draining to gravity at this time, SCD's in place, Low air loss mattress in place, 
wound dressings noted did not remove in order to promote wound healing, bed in lowest 
position, three side rails up, bed alarm on, fall, aspiration and isolation precautions in 
place.

## 2018-08-15 NOTE — NUR
WOUND CARE/CHG

WOUND CARE COMPLETED AS PER ORDERS. CHG BATH GIVEN. LINEN/GOWN CHANGED. PT TOLERATED WELL.

## 2018-08-15 NOTE — NUR
PT TRANSFERRED TO TELE 134A, REPORT GIVEN TO ASTRID. ENDORSED TO ASTRID THAT WOUND PICTURES HAVE 
NOT YET BEEN TAKEN TODAY AND THAT THEY NEED TO BE DONE.

## 2018-08-15 NOTE — NUR
RN rounds

patient resting in bed, eyes closed, breathing is even and unlabored, no signs of distress, 
PICC line patent and infusing well, IV Solu-Medrol given per MD orders, continuing to 
monitor the patient, bed in lowest position, three side rails up, bed alarm on, fall, 
aspiration and isolation precautions in place.

## 2018-08-15 NOTE — NUR
Closing note 

patient resting in bed, eyes closed, breathing is even and unlabored, no signs of distress, 
no residual output from G tube at this time, water flush given per MD orders, all needs met, 
will endorse report to NOC shift nurse, bed in lowest position, three side rails up, bed 
alarm on, fall, aspiration and isolation precautions in place.

## 2018-08-15 NOTE — NUR
RN rounds/Antibiotic

patient resting in bed, eyes closed, breathing is even and unlabored, no signs of distress, 
easy to wake, IV antibiotic hung and infusing well, PICC line in place and intact, no other 
needs at this time, bed in lowest position, three side rails up, bed alarm on, fall, 
aspiration and isolation precautions in place.

## 2018-08-15 NOTE — NUR
RECEIVED REPORT FROM ADI HUGGINS. PT IN BED WITH EYES OPEN, DOES NOT FOLLOW COMMANDS. SR ON 
MONITOR. PT ON 2LNC, O2 SAT 96%, UNLABORED RESPIRATIONS. GT WITH PIVOT 1.5 TUBE FEEDING AT 
45ML/HR. ILEOSTOMY WITH BROWN STOOL DRAINING. AGEE DRAINING URINE TO GRAVITY. SCDs IN USE. 
PATRICK PICC WITH D5W AT 100ML/HR INFUSING. HOB ELEVATED, BED LOCKED AND IN LOWEST POSITION, 
CALL LIGHT IN REACH. WILL CONTINUE TO MONITOR.

## 2018-08-16 VITALS — SYSTOLIC BLOOD PRESSURE: 105 MMHG

## 2018-08-16 VITALS — SYSTOLIC BLOOD PRESSURE: 114 MMHG

## 2018-08-16 VITALS — SYSTOLIC BLOOD PRESSURE: 107 MMHG

## 2018-08-16 VITALS — SYSTOLIC BLOOD PRESSURE: 120 MMHG

## 2018-08-16 VITALS — SYSTOLIC BLOOD PRESSURE: 108 MMHG

## 2018-08-16 LAB
ANION GAP SERPL CALCULATED.3IONS-SCNC: 6 MMOL/L (ref 5–15)
BUN SERPL-MCNC: 48 MG/DL (ref 8–21)
CALCIUM SERPL-MCNC: 9.7 MG/DL (ref 8.4–11)
CHLORIDE SERPL-SCNC: 117 MMOL/L (ref 98–107)
CREAT SERPL-MCNC: 0.54 MG/DL (ref 0.55–1.3)
GFR SERPL CREATININE-BSD FRML MDRD: (no result) ML/MIN (ref 90–?)
GLUCOSE SERPL-MCNC: 145 MG/DL (ref 70–99)
POTASSIUM SERPL-SCNC: 3.5 MMOL/L (ref 3.5–5.1)
SODIUM SERPLBLD-SCNC: 146 MMOL/L (ref 136–145)

## 2018-08-16 RX ADMIN — DILTIAZEM HYDROCHLORIDE SCH MG: 30 TABLET, FILM COATED ORAL at 08:15

## 2018-08-16 RX ADMIN — SOTALOL HYDROCHLORIDE SCH MG: 80 TABLET ORAL at 20:52

## 2018-08-16 RX ADMIN — DOCUSATE SODIUM LIQUID SCH MG: 100 LIQUID ORAL at 20:51

## 2018-08-16 RX ADMIN — SODIUM BICARBONATE SCH MLS/HR: 84 INJECTION, SOLUTION INTRAVENOUS at 20:53

## 2018-08-16 RX ADMIN — IPRATROPIUM BROMIDE AND ALBUTEROL SULFATE SCH ML: .5; 3 SOLUTION RESPIRATORY (INHALATION) at 08:00

## 2018-08-16 RX ADMIN — IPRATROPIUM BROMIDE AND ALBUTEROL SULFATE SCH ML: .5; 3 SOLUTION RESPIRATORY (INHALATION) at 19:35

## 2018-08-16 RX ADMIN — SOTALOL HYDROCHLORIDE SCH MG: 80 TABLET ORAL at 08:54

## 2018-08-16 RX ADMIN — SODIUM CHLORIDE SCH MLS/HR: 9 INJECTION, SOLUTION INTRAVENOUS at 20:52

## 2018-08-16 RX ADMIN — CASTOR OIL AND BALSAM, PERU SCH APPLIC: 788; 87 OINTMENT TOPICAL at 08:56

## 2018-08-16 RX ADMIN — DILTIAZEM HYDROCHLORIDE SCH MG: 30 TABLET, FILM COATED ORAL at 00:09

## 2018-08-16 RX ADMIN — Medication SCH UNIT: at 08:54

## 2018-08-16 RX ADMIN — IPRATROPIUM BROMIDE AND ALBUTEROL SULFATE SCH ML: .5; 3 SOLUTION RESPIRATORY (INHALATION) at 01:53

## 2018-08-16 RX ADMIN — Medication SCH MG: at 08:54

## 2018-08-16 RX ADMIN — DILTIAZEM HYDROCHLORIDE SCH MG: 30 TABLET, FILM COATED ORAL at 17:16

## 2018-08-16 RX ADMIN — SODIUM BICARBONATE SCH MLS/HR: 84 INJECTION, SOLUTION INTRAVENOUS at 06:08

## 2018-08-16 RX ADMIN — DEXTROSE MONOHYDRATE SCH MLS/HR: 50 INJECTION, SOLUTION INTRAVENOUS at 00:08

## 2018-08-16 RX ADMIN — FINASTERIDE SCH MG: 5 TABLET, FILM COATED ORAL at 08:53

## 2018-08-16 RX ADMIN — FLUCONAZOLE SCH MG: 100 TABLET ORAL at 08:53

## 2018-08-16 RX ADMIN — Medication SCH UNIT: at 20:51

## 2018-08-16 RX ADMIN — IPRATROPIUM BROMIDE AND ALBUTEROL SULFATE SCH ML: .5; 3 SOLUTION RESPIRATORY (INHALATION) at 13:18

## 2018-08-16 RX ADMIN — DEXTROSE MONOHYDRATE SCH MLS/HR: 50 INJECTION, SOLUTION INTRAVENOUS at 05:17

## 2018-08-16 RX ADMIN — DILTIAZEM HYDROCHLORIDE SCH MG: 30 TABLET, FILM COATED ORAL at 09:16

## 2018-08-16 NOTE — NUR
Closing Note

pt resting in bed, A&Ox1, respirations even and unlabored on 2L nasal canula, no pain noted 
using FLACC scale, PICC to upper right arm clean, dry, intact, and infusing well, tube 
feeding Pivot 1.5 @ 55ml/hr, chambers catheter draining to gravity, wound dressings clean, dry, 
and intact, wound care completed today, heels floating, SCDs in place, ROBERT in place, right 
upper ileostomy intact, no acute distress noted, fall and aspiration precautions in place, 
care endorsed to night shift RN.

## 2018-08-16 NOTE — NUR
OPENING NOTE

Received bedside sbar report from efren HUGGINS, Althea

Patient is awake/alert, unable to assess orientation.

No acute distress noted: 105/58 62 17 97.7 98% (2LNC) with no grimacing or signs of 
pain/discomfort.

IV site noted to PATRICK PICC line double lumen with D5W infusing @ 60ml/hr. No signs of redness 
or infiltration.

Beckman catheter noted with approx 150 ml of yellow urine.

Colostomy bag noted with approx 200 ml of light brown watery stool.

G-tube noted with Pivot 1.5 infusing @ 55ml/hr with approx 10ml residual.

SCD's applied/functioning. 

Introduced myself, updated whiteboard, discussed plan of care.

Bed to lowest position, 3 rails up, call light within reach, bed alarm activated.

Will continue to monitor patient.

## 2018-08-16 NOTE — NUR
Nutrition F/U



Admitting Diagnosis 

Pneumonia

Reviewed Pertinent Medical/Surgical Hx 

Medical Record

Patient

Medical History Comment: 

Pt found w/: Early pneumonia/Sepsis, Mild Respiratory distress, UTI, 

Hypernatremia, Hypercalcemia, mildly elevated BNP, febrile, hypotensive, 

hyperchloremia, dementia, chambers cath per ER MD documentation.

PMH: GERD, COPD per ER MD documentation.

8/16/18 ID Consult note: septic shock -- resolved; enterococcal septisemia;

HABP/PNA; CKD; MR; cerebral palsy; ileostomy

Subjective Information 

Pt seen resting in bed, +nasal cannula, +TF infusing as per MD notes.

Per nursing notes, pt had 5 ml residual earlier today.

Per EMR, TF Intakes: 540 ml 8/16/18. Output (stool): 350 ml 8/16/18.

Abd is soft w/ active bowel sounds. I/O: 1260/1150 (+110 ml) per 12 hours. 

Current TF regimen is inadequate as it provides 1350 kcal/day and 84 gm protein/day.

Current Diet Order/Nutrition Support 

Pivot 1.5 at 45 ml/hr, Free Water Flush: 200 Q8 via GT x3 days

Patient/Significant Other 

Unable To Verbalize

Education Provided 

Not Indicated

Pertinent Medications 

VIT D

Pertinent Labs 

Na 146 H,  H, BUN 48 H, Alb 1.7L, H/H 9.1 L/27.4 L, WBC 9.8 WNL (improved), 
Phosphorus 2.7 WNL (improved)

Height (Feet) 

5 feet

Height (Inches) 

9.00 inches

Weight (Pounds) 

116 pounds (8/13/18)

Weight (Calculated Kilograms) 

52.097925 kilograms

Patient Weight

52.617 kg

Body Mass Index

17.13 kg/m2

%IBW 

73

Ideal/Adjusted Body Weight 

160 lb, 73 kg

Weight Status 

Underweight

Difficulty With: 

Swallowing

Usual Diet At Home 

Pivot 1.5 at45ml x20 hrs, FWF 100cc q8h via GT per Amadeo Guillaume records

Skin Integrity Comment: 

Clifford scale: 12; per Wound Specialist note 8/14/18: 1. Right Sacral/Coccygeal Area:

Unstageable pressure ulcer, present on admission. 2. Scrotum: Erythema from IAD,

present on admission. No odor, no drainage. 3. Right Medial Malleolus: 

Scar tissue with dark discoloration, present on admission. 

Current % PO 

N/A on EN support

Estimated Energy Expenditure (kcals/day) 

2833-9673 kcal/day (BEE x 1.2-1.5 IBW for Sepsis)

Estimated Protein Required (g/day) 

110-146 gm/day (1.5-2 gm/kg IBW for Sepsis)

Estimated Fluid Required (l/day) 

1.7-2.1 L/day (1ml/kcal/day)

Problem/Etiology/Signs/Symptoms 

Increased nutritional needs related to metabolic demands as evidenced by 

estimated calorie and protein needs for Sepsis. 

*ongoing

Expected Outcomes/Goals 

Monitor tolerance to EN support and EN intake w/ goal of pt meeting at 

least 75% of estimated nutritional needs, labs trending WNL, normal GI 

function, skin integrity/wt maintenance.

Dietitian Recommendations 

*Recommend Pivot 1.5 at 55 ml/hr (goal), Free Water Flush: 250 Q8H via GT

Provides: 1980 kcal/day, 124 gm protein/day, and 1752 ml free water/day

Meets: 93% of upper end of estimated caloric needs and 85% of upper end of estimated 
protein needs

Follow Up 

High Risk: F/U in 2-3 days

-------------------------------------------------------------------------------

Addendum: 08/16/18 at 1522 by Kait Santana RD

-------------------------------------------------------------------------------

Nutrition Consult received for unstageable sacral-coccygeal PU.

## 2018-08-16 NOTE — NUR
Wound Care

wound care to sacrum/coccygeal area, scrotum and right medial malleolus completed at this 
time per orders, pt tolerated well, pt cleaned and linen changed, pt repositioned, ROBERT in 
place, heels floating, no acute distress noted, fall and aspiration precautions in place.

## 2018-08-16 NOTE — NUR
ROUNDS

Patient is resting comfortably, eyes closed with no acute distress noted.

IV picc line site is clean/dry/intact with no signs of redness/infiltration. 

G-tube residual approx 8ml. 

Respirations are equal as well as non-labored @ 17/min.

Bed to lowest position, 3 rails up, call light within reach, bed alarm activated.

Will continue to monitor patient.

## 2018-08-16 NOTE — NUR
Opening Note

received report from night shift RN, pt resting in bed, awake, A&0X1, pt nonverbal, 
respirations even and unlabored on 2L nasal canula, no pain noted using FLACC scale, no 
acute distress noted, PICC to right upper arm clean, dry, intact, and infusing well, tube 
feeding Pivot 1.5 @ 45ml/hr, ileostomy bag to right upper abdomen intact, chambers catheter 
draining to gravity, SCDs in place, pt educated on use of call light and asked to call for 
assistance, call light in reach, bed in low position, bed alarm on, fall, aspiration, and 
isolation precautions in place.

## 2018-08-16 NOTE — NUR
MD Rounds

Rounds with Dr. Aleman, per MD pt no longer needs to be on contact isolation, contact isolation 
precautions complete at this time.

## 2018-08-16 NOTE — NUR
Dietitian Recommendations 



*Recommend Pivot 1.5 at 55 ml/hr (goal), Free Water Flush: 250 Q8H via GT

Provides: 1980 kcal/day, 124 gm protein/day, and 1752 ml free water/day

Meets: 93% of upper end of estimated caloric needs and 85% of upper end of estimated 
protein needs

LP, RD



Please refer to Nutrition F/U for details.

## 2018-08-16 NOTE — NUR
RN Rounds

pt resting in bed, respiratory therapy at bedside, pt tolerating well, fall and aspiration 
precautions in place.

## 2018-08-16 NOTE — NUR
Medication

pt educated on medication use and side effects, pt tolerated medication administration well, 
no acute distress noted, no pain noted, fall, aspiration, and isolation precautions in 
place.

## 2018-08-16 NOTE — NUR
ROUNDS

Patient is resting comfortably, eyes closed with nasal canula applied and flowing @ 2L

IV picc line site is clean/dry/intact and infusing D5W @ 60ml/hr with no infiltration.

No shortness of breath, no labored breathing with respirations @ 16/min.

Bed to lowest position, 3 rails up, call light within reach, bed alarm activated.

Will continue to monitor patient.

## 2018-08-16 NOTE — NUR
RN Rounds

pt sleeping in bed, easily arousable to name, respirations even and unlabored on 2L nasal 
canula, no acute distress noted, fall, aspiration, and isolation precautions in place.

## 2018-08-16 NOTE — NUR
RN Rounds

pt sleeping in bed, easily arousable to name, tolerating O2 therapy on 2L nasal canula well, 
no acute distress noted, fall and aspiration precautions in place.

## 2018-08-16 NOTE — NUR
Medication

pt educated on medication use and side effects, 5ml residual from tube feeding, pt tolerated 
medication administration well, no acute distress noted, fall, aspiration, and isolation 
precautions in place.

## 2018-08-16 NOTE — NUR
Tube Feeding

order received to increase tube feeding from 45ml/hr to 55ml/hr and change free water flush 
to 250ml Q8hr, tube feeding adjusted at this time, pt tolerating well, no acute distress 
noted, fall and aspiration precautions in place.

## 2018-08-17 VITALS — SYSTOLIC BLOOD PRESSURE: 101 MMHG

## 2018-08-17 VITALS — SYSTOLIC BLOOD PRESSURE: 103 MMHG

## 2018-08-17 VITALS — SYSTOLIC BLOOD PRESSURE: 104 MMHG

## 2018-08-17 VITALS — SYSTOLIC BLOOD PRESSURE: 108 MMHG

## 2018-08-17 LAB
ANION GAP SERPL CALCULATED.3IONS-SCNC: 2 MMOL/L (ref 5–15)
BASOPHILS # BLD AUTO: 0 K/UL (ref 0–0.2)
BASOPHILS NFR BLD AUTO: 0.4 % (ref 0–2)
BUN SERPL-MCNC: 40 MG/DL (ref 8–21)
CALCIUM SERPL-MCNC: 9.3 MG/DL (ref 8.4–11)
CHLORIDE SERPL-SCNC: 115 MMOL/L (ref 98–107)
CREAT SERPL-MCNC: 0.39 MG/DL (ref 0.55–1.3)
EOSINOPHIL # BLD AUTO: 0 K/UL (ref 0–0.4)
EOSINOPHIL NFR BLD AUTO: 0.3 % (ref 0–4)
ERYTHROCYTE [DISTWIDTH] IN BLOOD BY AUTOMATED COUNT: 16.2 % (ref 9–15)
GFR SERPL CREATININE-BSD FRML MDRD: (no result) ML/MIN (ref 90–?)
GLUCOSE SERPL-MCNC: 96 MG/DL (ref 70–99)
HCT VFR BLD AUTO: 27 % (ref 36–54)
HGB BLD-MCNC: 9.1 G/DL (ref 14–18)
LYMPHOCYTES # BLD AUTO: 1.3 K/UL (ref 1–5.5)
LYMPHOCYTES NFR BLD AUTO: 12.5 % (ref 20.5–51.5)
MCH RBC QN AUTO: 30 PG (ref 27–31)
MCHC RBC AUTO-ENTMCNC: 34 % (ref 32–36)
MCV RBC AUTO: 88 FL (ref 79–98)
MONOCYTES # BLD MANUAL: 0.9 K/UL (ref 0–1)
MONOCYTES # BLD MANUAL: 9.1 % (ref 1.7–9.3)
NEUTROPHILS # BLD AUTO: 7.8 K/UL (ref 1.8–7.7)
NEUTROPHILS NFR BLD AUTO: 77.7 % (ref 40–70)
PLATELET # BLD AUTO: 205 K/UL (ref 130–430)
POTASSIUM SERPL-SCNC: 3.7 MMOL/L (ref 3.5–5.1)
RBC # BLD AUTO: 3.09 MIL/UL (ref 4.2–6.2)
SODIUM SERPLBLD-SCNC: 143 MMOL/L (ref 136–145)
WBC # BLD AUTO: 10 K/UL (ref 4.8–10.8)

## 2018-08-17 RX ADMIN — Medication SCH UNIT: at 09:45

## 2018-08-17 RX ADMIN — DOCUSATE SODIUM LIQUID SCH MG: 100 LIQUID ORAL at 09:42

## 2018-08-17 RX ADMIN — IPRATROPIUM BROMIDE AND ALBUTEROL SULFATE SCH ML: .5; 3 SOLUTION RESPIRATORY (INHALATION) at 19:56

## 2018-08-17 RX ADMIN — IPRATROPIUM BROMIDE AND ALBUTEROL SULFATE SCH ML: .5; 3 SOLUTION RESPIRATORY (INHALATION) at 07:12

## 2018-08-17 RX ADMIN — IPRATROPIUM BROMIDE AND ALBUTEROL SULFATE SCH ML: .5; 3 SOLUTION RESPIRATORY (INHALATION) at 00:18

## 2018-08-17 RX ADMIN — SODIUM CHLORIDE SCH MLS/HR: 9 INJECTION, SOLUTION INTRAVENOUS at 12:38

## 2018-08-17 RX ADMIN — DILTIAZEM HYDROCHLORIDE SCH MG: 30 TABLET, FILM COATED ORAL at 00:50

## 2018-08-17 RX ADMIN — FLUCONAZOLE SCH MG: 100 TABLET ORAL at 09:45

## 2018-08-17 RX ADMIN — SOTALOL HYDROCHLORIDE SCH MG: 80 TABLET ORAL at 09:43

## 2018-08-17 RX ADMIN — CASTOR OIL AND BALSAM, PERU SCH APPLIC: 788; 87 OINTMENT TOPICAL at 09:44

## 2018-08-17 RX ADMIN — FINASTERIDE SCH MG: 5 TABLET, FILM COATED ORAL at 09:45

## 2018-08-17 RX ADMIN — IPRATROPIUM BROMIDE AND ALBUTEROL SULFATE SCH ML: .5; 3 SOLUTION RESPIRATORY (INHALATION) at 13:06

## 2018-08-17 NOTE — NUR
rounds

resting comfortably, no acute distress noted. turned repositioned for comfort. gt feeding 
azucena well. no residual noted.

## 2018-08-17 NOTE — NUR
CLOSING NOTE

Bedside sbar report given to dayshift RN Terri

Patient is resting, eyes closed, no acute distress

All needs/interventions/expectations met by nightshift RN

Transfer of care successful.

## 2018-08-17 NOTE — NUR
Discharge - Gave SBAR report to Ahsan with [facility]. Patient is currently 
awake/alert, unable to assess orientation. 

All belongings sent with patient. PICC line and Beckman catheter still inserted per [facility] request. 

Patient taken via gurney. 

-------------------------------------------------------------------------------

Addendum: 08/17/18 at 2226 by Arcelia Pyle RN

-------------------------------------------------------------------------------

Correction, discharge time was 2100

## 2018-08-17 NOTE — NUR
OPENING NOTE

Received bedside sbar report from efren RNTerri

Patient is awake/alert, unable to assess orientation.

No acute distress noted: 101/52 71 19 97.1 98% (2LNC) with no grimacing or signs of 
pain/discomfort.

IV site noted to PATRICK PICC line double lumen saline locked.

Beckman catheter noted with approx 50 ml of yellow urine.

Colostomy bag noted with approx 50 ml of light brown watery stool.

G-tube noted.

SCD's applied/functioning. 

Introduced myself, updated whiteboard, discussed plan of care.

Bed to lowest position, 3 rails up, call light within reach, bed alarm activated.

Patient to be discharged, ambulance scheduled to arrive between 1900/1930

## 2018-08-17 NOTE — NUR
ROUNDS

Patient is resting comfortably, eyes open with no acute distress noted.

Picc line site is clean/dry/intact and infusing D5W @ 60ml/hr with no signs of redness or 
infiltration.

Symmetric rise and fall of chest with non-labored respirations @ 18/min. 2L NC 
applied/flowing.

Bed to lowest position, 3 rails up, call light within reach, bed alarm activated.

Will continue to monitor patient.

## 2018-08-17 NOTE — NUR
initial notes

rec patient awake but non verbally responsive with hob elevated. picc line on the r upper 
arm intact, infusing well and no infiltrationnoted. with o2 at 2 liters via nasal cannula. 
no sob noted. bed in low position and side rails up and locked. call light within reached. 
bed / safety precaution reinforced.

## 2018-08-17 NOTE — NUR
Received call from Brady with [facility] she says she spoke to Hugo and confirmed that bed 
was saved and admit was approved. Ok to transport patient to room # 22-a

## 2018-08-17 NOTE — NUR
closing notes

will endorsed to next shift, pt will be transferred to [facility]. picture was taken . bed 
in low position and side rails up and locked.

## 2018-08-17 NOTE — NUR
Called to give report to [facility] and spoke to JOSELUIS Card. She says that nobody told them 
that patient was coming and he is not on their schedule. Informed her that case management 
spoke to Hugo and he accepted and provided admission room #. She says she will need to 
call him to discuss because patient is not listed on their intake notice. Informed her that 
the scheduled ambulance will be here at any moment.

## 2018-08-17 NOTE — NUR
Beckman catheter is leaking urine onto chucks. Deflated balloon and received approx 8ml. 
Reinflated balloon with 10ml NS and retrieved approximately 50ml yellow urine. Will continue 
to monitor urine output.

## 2018-08-17 NOTE — NUR
ROUNDS

Patient is resting, eyes closed, and easily arouses to light stimulation.

Beckman catheter is properly draining with no leakage at this time. 

PATRICK picc site dressing is clean/dry/intact and infusing D5W @ 60ml/hr with no infiltration.

Equal rise and fall of chest with respirations @ 17/min.

SCD's applied/functioning.

Bed to lowest position, 3 rails up, call light within reach, bed alarm activated.

Will continue to monitor patient.

## 2018-08-17 NOTE — NUR
Discharge Planning:

Pt has dc order to return to SNF; TAYLER has faxed referral to [facility] ([phone] 
[phone]). 

-------------------------------------------------------------------------------

Addendum: 08/17/18 at 1407 by Dory Nicole LCSW

-------------------------------------------------------------------------------

TAYLER received a voice mail from Hugo at [facility].  LESLIEW called back and left a 
voicemail regarding pt returning to SNF.  LESLIEW awaiting call back from Hugo.  LESLIEW will 
continue to follow up.

## 2018-08-17 NOTE — NUR
rounds

due meds were given and azucena well trough the gt. no residual noted.turned repositioned for 
comfort. no acute distress noted.

## 2018-08-17 NOTE — NUR
Discharge Planning Note:

LCSW received a call back from Hugo at [facility] ([phone]) stating that pt can 
return to Rm. 22A.  LCSW called and spoke with Replaced by [facility] Worker, Prema Fox 
([phone]); Prema is aware and agreeable to pt's transfer to [facility] today.  LCSW 
called [facility] ([phone]) and arranged transport for between 1900 and 1930.  
LCSW prepared pt's packet and placed packet at the nurses station.  LCSW updated Monitor 
Tech and ADR Nurse, Esther.

## 2018-08-28 ENCOUNTER — HOSPITAL ENCOUNTER (EMERGENCY)
Dept: HOSPITAL 4 - SED | Age: 78
Discharge: HOME | End: 2018-08-28
Payer: MEDICARE

## 2018-08-28 VITALS — SYSTOLIC BLOOD PRESSURE: 126 MMHG

## 2018-08-28 VITALS — WEIGHT: 155 LBS | HEIGHT: 63 IN | BODY MASS INDEX: 27.46 KG/M2

## 2018-08-28 VITALS — SYSTOLIC BLOOD PRESSURE: 120 MMHG

## 2018-08-28 DIAGNOSIS — I11.0: ICD-10-CM

## 2018-08-28 DIAGNOSIS — F03.90: ICD-10-CM

## 2018-08-28 DIAGNOSIS — Z79.899: ICD-10-CM

## 2018-08-28 DIAGNOSIS — K21.9: ICD-10-CM

## 2018-08-28 DIAGNOSIS — J44.9: ICD-10-CM

## 2018-08-28 DIAGNOSIS — I48.91: ICD-10-CM

## 2018-08-28 DIAGNOSIS — I50.9: ICD-10-CM

## 2018-08-28 DIAGNOSIS — Z43.1: Primary | ICD-10-CM

## 2018-08-28 PROCEDURE — 43760: CPT

## 2018-08-28 PROCEDURE — 99284 EMERGENCY DEPT VISIT MOD MDM: CPT

## 2018-08-28 PROCEDURE — 74240 X-RAY XM UPR GI TRC 1CNTRST: CPT

## 2018-09-01 ENCOUNTER — HOSPITAL ENCOUNTER (INPATIENT)
Dept: HOSPITAL 4 - SED | Age: 78
LOS: 4 days | Discharge: NURSING, REHAB OR LONG-TERM CARE | DRG: 137 | End: 2018-09-05
Attending: INTERNAL MEDICINE | Admitting: INTERNAL MEDICINE
Payer: MEDICAID

## 2018-09-01 VITALS — SYSTOLIC BLOOD PRESSURE: 112 MMHG

## 2018-09-01 VITALS — HEIGHT: 68 IN | BODY MASS INDEX: 16.37 KG/M2 | WEIGHT: 108 LBS

## 2018-09-01 VITALS — SYSTOLIC BLOOD PRESSURE: 116 MMHG

## 2018-09-01 VITALS — SYSTOLIC BLOOD PRESSURE: 114 MMHG

## 2018-09-01 DIAGNOSIS — I50.9: ICD-10-CM

## 2018-09-01 DIAGNOSIS — G80.9: ICD-10-CM

## 2018-09-01 DIAGNOSIS — J44.9: ICD-10-CM

## 2018-09-01 DIAGNOSIS — Z93.3: ICD-10-CM

## 2018-09-01 DIAGNOSIS — F79: ICD-10-CM

## 2018-09-01 DIAGNOSIS — J69.0: Primary | ICD-10-CM

## 2018-09-01 DIAGNOSIS — J96.01: ICD-10-CM

## 2018-09-01 DIAGNOSIS — K21.9: ICD-10-CM

## 2018-09-01 DIAGNOSIS — Z87.01: ICD-10-CM

## 2018-09-01 DIAGNOSIS — L89.159: ICD-10-CM

## 2018-09-01 DIAGNOSIS — N40.0: ICD-10-CM

## 2018-09-01 DIAGNOSIS — R79.1: ICD-10-CM

## 2018-09-01 DIAGNOSIS — F03.90: ICD-10-CM

## 2018-09-01 DIAGNOSIS — E43: ICD-10-CM

## 2018-09-01 DIAGNOSIS — D50.9: ICD-10-CM

## 2018-09-01 DIAGNOSIS — E87.8: ICD-10-CM

## 2018-09-01 DIAGNOSIS — E86.0: ICD-10-CM

## 2018-09-01 DIAGNOSIS — I11.0: ICD-10-CM

## 2018-09-01 DIAGNOSIS — Z93.1: ICD-10-CM

## 2018-09-01 DIAGNOSIS — Z93.2: ICD-10-CM

## 2018-09-01 DIAGNOSIS — Z79.899: ICD-10-CM

## 2018-09-01 DIAGNOSIS — N28.9: ICD-10-CM

## 2018-09-01 DIAGNOSIS — R13.10: ICD-10-CM

## 2018-09-01 DIAGNOSIS — E87.0: ICD-10-CM

## 2018-09-01 DIAGNOSIS — I48.0: ICD-10-CM

## 2018-09-01 LAB
ALBUMIN SERPL BCP-MCNC: 2.2 G/DL (ref 3.4–4.8)
ALT SERPL W P-5'-P-CCNC: 30 U/L (ref 12–78)
AMORPH SED URNS QL MICRO: (no result) /HPF
ANION GAP SERPL CALCULATED.3IONS-SCNC: 1 MMOL/L (ref 5–15)
APPEARANCE UR: (no result)
AST SERPL W P-5'-P-CCNC: 16 U/L (ref 10–37)
BACTERIA URNS QL MICRO: (no result) /HPF
BASOPHILS # BLD AUTO: 0.2 K/UL (ref 0–0.2)
BASOPHILS NFR BLD AUTO: 1.3 % (ref 0–2)
BILIRUB SERPL-MCNC: 0.5 MG/DL (ref 0–1)
BILIRUB UR QL STRIP: NEGATIVE
BUN SERPL-MCNC: 48 MG/DL (ref 8–21)
CALCIUM SERPL-MCNC: 10 MG/DL (ref 8.4–11)
CHLORIDE SERPL-SCNC: 121 MMOL/L (ref 98–107)
COLOR UR: YELLOW
CREAT SERPL-MCNC: 0.54 MG/DL (ref 0.55–1.3)
EOSINOPHIL # BLD AUTO: 0 K/UL (ref 0–0.4)
EOSINOPHIL NFR BLD AUTO: 0.2 % (ref 0–4)
ERYTHROCYTE [DISTWIDTH] IN BLOOD BY AUTOMATED COUNT: 16.9 % (ref 9–15)
GFR SERPL CREATININE-BSD FRML MDRD: (no result) ML/MIN (ref 90–?)
GLUCOSE SERPL-MCNC: 116 MG/DL (ref 70–99)
GLUCOSE UR STRIP-MCNC: NEGATIVE MG/DL
HCT VFR BLD AUTO: 34.5 % (ref 36–54)
HGB BLD-MCNC: 11.5 G/DL (ref 14–18)
HGB UR QL STRIP: (no result)
INR PPP: 1 (ref 0.8–1.2)
KETONES UR STRIP-MCNC: (no result) MG/DL
LEUKOCYTE ESTERASE UR QL STRIP: (no result)
LYMPHOCYTES # BLD AUTO: 0.9 K/UL (ref 1–5.5)
LYMPHOCYTES NFR BLD AUTO: 7.1 % (ref 20.5–51.5)
MCH RBC QN AUTO: 30 PG (ref 27–31)
MCHC RBC AUTO-ENTMCNC: 34 % (ref 32–36)
MCV RBC AUTO: 88 FL (ref 79–98)
MONOCYTES # BLD MANUAL: 0.7 K/UL (ref 0–1)
MONOCYTES # BLD MANUAL: 5.7 % (ref 1.7–9.3)
MUCOUS THREADS URNS QL MICRO: (no result) /LPF
NEUTROPHILS # BLD AUTO: 10.7 K/UL (ref 1.8–7.7)
NEUTROPHILS NFR BLD AUTO: 85.7 % (ref 40–70)
NITRITE UR QL STRIP: NEGATIVE
PH UR STRIP: 6 [PH] (ref 5–8)
PLATELET # BLD AUTO: 224 K/UL (ref 130–430)
POTASSIUM SERPL-SCNC: 3.6 MMOL/L (ref 3.5–5.1)
PROT UR QL STRIP: (no result)
PROTHROMBIN TIME: 10.1 SECS (ref 9.5–12.5)
RBC # BLD AUTO: 3.9 MIL/UL (ref 4.2–6.2)
SODIUM SERPLBLD-SCNC: 149 MMOL/L (ref 136–145)
SP GR UR STRIP: 1.01 (ref 1–1.03)
UROBILINOGEN UR STRIP-MCNC: 0.2 MG/DL (ref 0.2–1)
WBC # BLD AUTO: 12.5 K/UL (ref 4.8–10.8)
WBC #/AREA URNS HPF: (no result) /HPF (ref 0–3)

## 2018-09-01 PROCEDURE — A5061 POUCH DRAINABLE W BARRIER AT: HCPCS

## 2018-09-01 RX ADMIN — IPRATROPIUM BROMIDE AND ALBUTEROL SULFATE SCH ML: .5; 3 SOLUTION RESPIRATORY (INHALATION) at 19:12

## 2018-09-01 RX ADMIN — Medication SCH UNIT: at 21:02

## 2018-09-01 RX ADMIN — TAMSULOSIN HYDROCHLORIDE SCH MG: 0.4 CAPSULE ORAL at 21:02

## 2018-09-01 RX ADMIN — SOTALOL HYDROCHLORIDE SCH MG: 80 TABLET ORAL at 21:01

## 2018-09-01 RX ADMIN — METOCLOPRAMIDE HYDROCHLORIDE SCH MG: 5 SOLUTION ORAL at 20:59

## 2018-09-01 RX ADMIN — METOCLOPRAMIDE HYDROCHLORIDE SCH MG: 5 SOLUTION ORAL at 16:18

## 2018-09-01 RX ADMIN — OXYCODONE HYDROCHLORIDE AND ACETAMINOPHEN SCH MG: 500 TABLET ORAL at 20:59

## 2018-09-01 RX ADMIN — LACTULOSE SCH ML: 10 SOLUTION ORAL; RECTAL at 21:05

## 2018-09-01 NOTE — NUR
# 16 FR Chambers catheter with use of sterile technique. Immediate return of 10

 cc  yellow urine noted.  Bedside drainage bag placed below level of bladder.  
Urine sample collected and sent to lab.  Pt tolerated procedure well.



Patient arrived with chambers in place, changed due to standard of practice prior 
to admission.



Patient unable to toilet self.

## 2018-09-01 NOTE — NUR
CHANGE OF SHIFT;

pt. sleeping when received, opens eyes when name called, non verbal, does not follow 
commands. On Heparin drip @ 900 units/hr=9cc/hr via PICC line on left upper arm and IVF of 
1/2 NS 2 100 cc/hr. O2 @ 2l/nc. G tube feeding @ 50 cc/hr. chambers cath to OSD, ileostomy bag 
with liquid greenish drainage. side rails up.

-------------------------------------------------------------------------------

Addendum: 09/02/18 at 0421 by Myah Montoya RN

-------------------------------------------------------------------------------

PICC line on rt. upper arm, double lumen catheter.

## 2018-09-01 NOTE — NUR
US VENOUS DOPPLER



US VENOUSE DOPPLER TO BILAT LOWER EXTREMITY DONE AT BEDSIDE. PATIENT LILLIAM WELL. ALL NEEDS 
MET. CONT TO MONITOR.

## 2018-09-01 NOTE — NUR
ADMISSION NOTE

Received patient from ER via alcira, received report from TIERA HUGGINS. Patient admitted with 
diagnosis of ACUTE RESPIRATORY FAILURE. Patient oriented to hospital routine, call light, 
toileting and safety.

## 2018-09-01 NOTE — NUR
Pt bib BLS from [facility] for low O2 sat on RA. Pt presents on 10L NRB at 
97-98%. Pt nonverbal baseline, nonambulatory, pressure ulcers noted on buttocks 
,GT,Beckman cath and PICC line in place. Pt afebrile at this time with HR 92.

## 2018-09-01 NOTE — NUR
NOTES:

repositioned. emptied ileostomy. IVF infusing via PICC line. checked  g tube residual less 
than 10 cc. pt. contracted on upper and lower extremities. cardiac pattern on sinus rhythm. 
chambers cath intact to OSD. on air mattress. HOB elevated, both heels up with pillow. side 
rails up. call light within reach, hourly rounds.

## 2018-09-01 NOTE — NUR
CLOSING NOTE



PATIENT RESTING IN BED, EASILY AROUSABLE. LILLIAM O2@2L/M VIA NC. NO ACUTE DISTRESS. NO SOB. 
RESPIRATION EVEN AND UNLABORED. HOB>45. SKIN WARM AND DRY TO TOUCH. TOLERATING GTUBE FEEDING 
AS ORDERED.  PICC LINE TO RIGHT UPPER ARM INTACT AND PATENT. TOLERATING IVF AND HEPARIN DRIP 
AS ORDERED. NO S/SX ABNORMAL BLEEDING NOTED. AGEE CATH DRAINING CLOUDY YELLOW URINE. KEPT 
CLEAN AND DRY. LILLIAM AIR MATTRESS AND REPOSITIONING. ALL NEEDS MET. CALL LIGHT IN REACH. CONT 
TO MONITOR. WILL ENDORSE TO ONCOMING SHIFT.

## 2018-09-01 NOTE — NUR
CONSULTATION PAGED/CALLED

Reason for Consultation: []  ACUTE RESP FAIL

Person Who was Notified: []  MARY

Consulting Physician: []   DR BAXTER

Consultant Specialty: []  PULMONOLOGY

Ordering Physician: []  DR MISTRY

## 2018-09-01 NOTE — NUR
SPOKE TO  AND REPORTED UNABLE TO GET CONSENT FOR VQ SCAN FROM RESPONSIBLE PARTY. 
INFORMED MD PATIENTS BILAT VENOUS DOPPLER WAS NEGATIVE AND PATIENT IS ON HEPARIN DRIP.



PER  CANCEL VQ SCAN TODAY AND HE WILL TAKE CARE OF IT TOMORROW.

## 2018-09-01 NOTE — NUR
NOTES



PATIENT RESTING IN BED, EASILY AROUSABLE. PATIENT HOB IS >45. TOLERATING GTUBE FEEDING AS 
ORDERED WITH NO N/V NOTED. REPOSITIONED FOR COMFORT. ALL NEEDS MET. CALL LIGHT IN REACH. 
CONT TO MONITOR WITH FREQUENT VISUAL CHECK

## 2018-09-01 NOTE — NUR
Notes



Patient from ER. Patient awake. Unable to make needs known. No s/sx pain noted. Cont on 
O2@2L/M via NC, azucena well. No acute distress. No SOB. Respiration even and unlabored. Skin 
warm and dry to touch.  PICC line to right upper extremity intact and patent covered with 
clean dry dressing. Patient is contracted to all extremities. Gtube intact and patent. 
Ileostomy to RLQ. Beckman inserted 9/1/18 in ER draining yellow urine. Wound noted to right 
sacral area with no bleeding, no drainage and no odor noted. Bed in low and locked position. 
Siderail up x3. Bed alarm on. Room near nurses station.  Call light in reach. Cont to 
monitor with frequent visual checks.

## 2018-09-01 NOTE — NUR
Nutrition Note



RD modified pt's TF formula from Osmolite 1.2 to Pivot 1.5 at same MD order rate of 50 
ml/hr, as [facility] does not have Osmolite 1.2 readily availabl. Pivot 1.5 TF formula already 
features arginine, glutamine, and a very high protein content to help support protein 
synthesis, tissue repair, and wound healing -- therefore, previous Erwin BID order was D/C 
as well. RD notified pt's primary RN of TF modification.

-------------------------------------------------------------------------------

Addendum: 09/01/18 at 1620 by Kait Santana RD

-------------------------------------------------------------------------------

RD to continue to follow as per nutrition care standards.

## 2018-09-01 NOTE — NUR
Patient will be admitted to care of .  Admitted to Telemetry unit.  
Will go to room 105B.  Belongings list completed.  Summary report printed. 
Report will be given at bedside.

## 2018-09-02 VITALS — SYSTOLIC BLOOD PRESSURE: 104 MMHG

## 2018-09-02 VITALS — SYSTOLIC BLOOD PRESSURE: 119 MMHG

## 2018-09-02 VITALS — SYSTOLIC BLOOD PRESSURE: 114 MMHG

## 2018-09-02 VITALS — SYSTOLIC BLOOD PRESSURE: 100 MMHG

## 2018-09-02 LAB
ALBUMIN SERPL BCP-MCNC: 1.7 G/DL (ref 3.4–4.8)
ALT SERPL W P-5'-P-CCNC: 29 U/L (ref 12–78)
ANION GAP SERPL CALCULATED.3IONS-SCNC: 4 MMOL/L (ref 5–15)
AST SERPL W P-5'-P-CCNC: 16 U/L (ref 10–37)
BASOPHILS NFR BLD MANUAL: 0 % (ref 0–2)
BILIRUB SERPL-MCNC: 0.3 MG/DL (ref 0–1)
BUN SERPL-MCNC: 49 MG/DL (ref 8–21)
CALCIUM SERPL-MCNC: 9.4 MG/DL (ref 8.4–11)
CHLORIDE SERPL-SCNC: 121 MMOL/L (ref 98–107)
CREAT SERPL-MCNC: 0.56 MG/DL (ref 0.55–1.3)
EOSINOPHIL NFR BLD MANUAL: 1 % (ref 0–7)
ERYTHROCYTE [DISTWIDTH] IN BLOOD BY AUTOMATED COUNT: 17.2 % (ref 9–15)
GFR SERPL CREATININE-BSD FRML MDRD: (no result) ML/MIN (ref 90–?)
GLUCOSE SERPL-MCNC: 124 MG/DL (ref 70–99)
HCT VFR BLD AUTO: 29.6 % (ref 36–54)
HGB BLD-MCNC: 9.9 G/DL (ref 14–18)
LYMPHOCYTES NFR BLD MANUAL: 11 % (ref 20–46)
MCH RBC QN AUTO: 30 PG (ref 27–31)
MCHC RBC AUTO-ENTMCNC: 34 % (ref 32–36)
MCV RBC AUTO: 89 FL (ref 79–98)
MONOCYTES # BLD MANUAL: 7 % (ref 0–11)
NEUTS BAND NFR BLD MANUAL: 12 % (ref 0–6)
NRBC BLD MANUAL-RTO: 1 /100WBC (ref 0–0)
PLATELET # BLD AUTO: 188 K/UL (ref 130–430)
POTASSIUM SERPL-SCNC: 3.7 MMOL/L (ref 3.5–5.1)
RBC # BLD AUTO: 3.34 MIL/UL (ref 4.2–6.2)
SODIUM SERPLBLD-SCNC: 150 MMOL/L (ref 136–145)
T4 FREE SERPL-MCNC: 0.7 NG/DL (ref 0.6–1.6)
TIBC SERPL-MCNC: 145 UG/DL (ref 250–450)
TSH SERPL DL<=0.05 MIU/L-ACNC: 0.45 UIU/ML (ref 0.34–4.82)
VARIANT LYMPHS NFR BLD MANUAL: 1 % (ref 0–0)
WBC # BLD AUTO: 8.7 K/UL (ref 4.8–10.8)

## 2018-09-02 RX ADMIN — DEXTROSE MONOHYDRATE SCH MLS/HR: 50 INJECTION, SOLUTION INTRAVENOUS at 09:01

## 2018-09-02 RX ADMIN — IPRATROPIUM BROMIDE AND ALBUTEROL SULFATE SCH ML: .5; 3 SOLUTION RESPIRATORY (INHALATION) at 01:10

## 2018-09-02 RX ADMIN — OXYCODONE HYDROCHLORIDE AND ACETAMINOPHEN SCH MG: 500 TABLET ORAL at 20:43

## 2018-09-02 RX ADMIN — METOCLOPRAMIDE HYDROCHLORIDE SCH MG: 5 SOLUTION ORAL at 14:23

## 2018-09-02 RX ADMIN — Medication SCH UNIT: at 20:42

## 2018-09-02 RX ADMIN — METOCLOPRAMIDE HYDROCHLORIDE SCH MG: 5 SOLUTION ORAL at 08:59

## 2018-09-02 RX ADMIN — LEVOFLOXACIN SCH MLS/HR: 250 INJECTION, SOLUTION INTRAVENOUS at 20:40

## 2018-09-02 RX ADMIN — LACTULOSE SCH ML: 10 SOLUTION ORAL; RECTAL at 09:00

## 2018-09-02 RX ADMIN — HEPARIN SODIUM AND DEXTROSE PRN MLS/HR: 10000; 5 INJECTION INTRAVENOUS at 00:17

## 2018-09-02 RX ADMIN — FINASTERIDE SCH MG: 5 TABLET, FILM COATED ORAL at 09:06

## 2018-09-02 RX ADMIN — TAMSULOSIN HYDROCHLORIDE SCH MG: 0.4 CAPSULE ORAL at 20:42

## 2018-09-02 RX ADMIN — SOTALOL HYDROCHLORIDE SCH MG: 80 TABLET ORAL at 09:01

## 2018-09-02 RX ADMIN — MULTIVITAMIN SCH ML: LIQUID ORAL at 09:02

## 2018-09-02 RX ADMIN — Medication SCH GM: at 09:00

## 2018-09-02 RX ADMIN — Medication SCH UNIT: at 09:01

## 2018-09-02 RX ADMIN — SOTALOL HYDROCHLORIDE SCH MG: 80 TABLET ORAL at 20:41

## 2018-09-02 RX ADMIN — IPRATROPIUM BROMIDE AND ALBUTEROL SULFATE SCH ML: .5; 3 SOLUTION RESPIRATORY (INHALATION) at 19:32

## 2018-09-02 RX ADMIN — LACTULOSE SCH ML: 10 SOLUTION ORAL; RECTAL at 20:42

## 2018-09-02 RX ADMIN — SODIUM BICARBONATE SCH MLS/HR: 84 INJECTION, SOLUTION INTRAVENOUS at 18:34

## 2018-09-02 RX ADMIN — IPRATROPIUM BROMIDE AND ALBUTEROL SULFATE SCH ML: .5; 3 SOLUTION RESPIRATORY (INHALATION) at 07:08

## 2018-09-02 RX ADMIN — IPRATROPIUM BROMIDE AND ALBUTEROL SULFATE SCH ML: .5; 3 SOLUTION RESPIRATORY (INHALATION) at 13:25

## 2018-09-02 RX ADMIN — METOCLOPRAMIDE HYDROCHLORIDE SCH MG: 5 SOLUTION ORAL at 20:42

## 2018-09-02 RX ADMIN — OXYCODONE HYDROCHLORIDE AND ACETAMINOPHEN SCH MG: 500 TABLET ORAL at 09:01

## 2018-09-02 RX ADMIN — HEPARIN SODIUM AND DEXTROSE PRN MLS/HR: 10000; 5 INJECTION INTRAVENOUS at 14:32

## 2018-09-02 RX ADMIN — LOPERAMIDE HYDROCHLORIDE SCH MG: 2 CAPSULE ORAL at 20:40

## 2018-09-02 NOTE — NUR
NOTES:

APTT 43 sec. ,increase heparin drip by 100 unit, up to 1000 units/hr=10 cc/hr, double 
checked with charge nurse Romelia, will recheck APTT in 6 hours.

## 2018-09-02 NOTE — NUR
HEPARIN DRIP ADJUSTED

PTT RETURNED AT 49.9 AND PER HEPARIN PROTOCOL, AN INCREASE OF 1ML/HR WAS MADE AND TIMED PTT 
WAS ENTERED FOR 1400. WILL CONTINUE TO MONITOR PT.

## 2018-09-02 NOTE — NUR
NOTES;

condition unchanged . IVF infusing, Heparin drip kept @ 1000 units/hr. pending APTT result. 
chambers cath, ileostomy intact. for further care and assistance.

## 2018-09-02 NOTE — NUR
ROUNDS

PT IS AWAKE AND ALERT. NO SIGNS OR SYMPTOMS OF DISTRESS OR SOB NOTED. PT HEPARIN DRIP WAS 
INCREASED TO 12ML/HR PER HEPARIN PROTOCOL. RESIDUALS WERE CHECKED AND LESS THAN 5ML OF 
RETURN. CHANGE GTUBE FEEDING AND TUBING AND FLUSHED WITH 100ML OF WATER. REPOSITIONED PT TO 
LEFT SIDE. CURRENT NEEDS ARE MET. BED IS AT LOWEST POSITION, CALL LIGHT WITHIN REACH, THREE 
SIDE RAILS UP, BED ALARM IS ON. WILL CONTINUE TO MONITOR.

## 2018-09-02 NOTE — NUR
DR. MISTRY CALL BACK

SPOKE TO MD REGARDING OUR PHARMACY ONLY STOCKING IMODIUM CAPSULES. MD STATES ITS OKAY TO 
CHANGE FROM LIQUID FORM TO CAPSULE FORM VIA SnaptUBE. PHARMACY CALLED AND CHANGED IT.

## 2018-09-02 NOTE — NUR
Patient in bed resting. Ileostomy output of 325ml liquid stool. No pain or respiratory 
distress. patient is attempting to remove NC from nose. Informed patient to keep in place. 
Safety precautions in place.

## 2018-09-02 NOTE — NUR
OPENING NOTE

REPORT IS RECEIVED FROM NIGHT SHIFT NURSE AND CARE IS ENDORSED TO MYSELF. PT IS RECEIVED 
SLEEPING. PT IS NONVERBAL BUT AWAKENS WITH LIGHT STIMULI. PT AGEE CATHETER 100ML OF RAD 
URINE. PT HAS GTUBE WITH APPROXIMATELY 50ML OF RESIDUALS. MORNING MEDICATION WERE GIVEN VIA 
GTUBE AND FLUSHED WITH 100ML OF WATER. PT IS ON HEPARIN DRIP CURRENTLY INFUSING AT 10ML/HR. 
WHITE BOARD IS UPDATED. CURRENT NEEDS ARE MET. BED IS AT LOWEST POSITION, CALL LIGHT WITHIN 
REACH, THREE SIDE RAILS UP, BED ALARM IS ON. WILL CONTINUE TO MONITOR.

## 2018-09-02 NOTE — NUR
ROUNDS

PT IS SLEEPING BUT EASILY AWAKEN. NO SIGNS OR SYMPTOMS OF DISTRESS OR SOB NOTED. PICC LINE 
DRESSING WAS CHANGED USING STERILE PROCEDURE. PT TOLERATED IT WELL. PT WAS REPOSITIONED TO 
RIGHT SIDE. CURRENT NEEDS ARE MET. BED IS AT LOWEST POSITION, CALL LIGHT WITHIN REACH, THREE 
SIDE RAILS UP, BED ALARM IS ON. WILL CONTINUE TO MONITOR.

-------------------------------------------------------------------------------

Addendum: 09/02/18 at 1224 by Kelly Gordillo RN

-------------------------------------------------------------------------------

CORRECTION PT WAS REPOSITIONED TO LEFT SIDE.

## 2018-09-02 NOTE — NUR
CALLED LAB

HAVE NOT SEEN RESULTS FOR SCHEDULED PTT FROM 0600 MORNING LAB DRAW AND WAS ADVISED THAT THE 
ORDER WAS INPUT INCORRECTLY. CORRECTED THE ORDER AND LAB CAN STILL BE OBTAINED FROM THIS 
MORNING LAB DRAWS. WILL MONITOR FOR RESULTS AND ADJUST ACCORDING TO RESULTS.

## 2018-09-02 NOTE — NUR
ROUNDS

PT IS SLEEPING BUT EASILY AWAKEN. NO SIGNS OR SYMPTOMS OF DISTRESS OR SOB NOTED. PT GIVEN 
100ML OF WATER. PT REPOSITIONED TO RIGHT SIDE. CURRENT NEEDS ARE MET. BED IS AT LOWEST 
POSITION, CALL LIGHT WITHIN REACH, THREE SIDE RAILS UP, BED ALARM IS ON. WILL CONTINUE TO 
MONITOR.

## 2018-09-02 NOTE — NUR
ROUNDS

PT IS AWAKE AND ALERT. NO SIGNS OR SYMPTOMS OF DISTRESS OR SOB NOTED. HEPARIN ADJUSTED PER 
PROTOCOL AND INCREASED BY 1ML PER HOUR. HEPARIN IS NOW INFUSING AT 11ML/HR. PT WAS 
REPOSITIONED TO RIGHT SIDE. CURRENT NEEDS ARE MET. BED IS AT LOWEST POSITION, CALL LIGHT 
WITHIN REACH, THREE SIDE RAILS UP, BED ALARM IS ON. WILL CONTINUE TO MONITOR.

## 2018-09-02 NOTE — NUR
Opening Notes

Received patient in bed resting. AAOx1 and responds to name. Non verbal. O2 sat 99% on 2L 
NC. Beckman draining by gravity yellow urine. PICC line on the PATRICK double lumen infusing 
Fluids and heparin at 12ml/hr. PICC site clean dry and intact. GT Feeding at 50ml/hr with 
site clean and dry. Patient has ileostomy on the right lower quadrant with liquid. Oriented 
the patient to the room and use of the call light. Unable to assess patients understanding 
of the teaching. Safety precautions in place. Bed in low position and will monitor patient 
on rounds.

## 2018-09-02 NOTE — NUR
CLOSING NOTE

PT IS AWAKE AND ALERT. NO SIGNS OR SYMPTOMS OF DISTRESS OR SOB NOTED. PT WAS REPOSITIONED TO 
RIGHT SIDE. CURRENT NEEDS ARE MET. BED IS AT LOWEST POSITION, CALL LIGHT WITHIN REACH, THREE 
SIDE RAILS UP, BED ALARM IS ON. WILL CONTINUE TO MONITOR UNTIL CARE AND REPORT IS GIVEN TO 
NIGHT SHIFT NURSE.

## 2018-09-03 VITALS — SYSTOLIC BLOOD PRESSURE: 114 MMHG

## 2018-09-03 VITALS — SYSTOLIC BLOOD PRESSURE: 119 MMHG

## 2018-09-03 VITALS — SYSTOLIC BLOOD PRESSURE: 110 MMHG

## 2018-09-03 VITALS — SYSTOLIC BLOOD PRESSURE: 109 MMHG

## 2018-09-03 VITALS — SYSTOLIC BLOOD PRESSURE: 113 MMHG

## 2018-09-03 LAB
ANION GAP SERPL CALCULATED.3IONS-SCNC: 8 MMOL/L (ref 5–15)
BASOPHILS # BLD AUTO: 0 K/UL (ref 0–0.2)
BASOPHILS NFR BLD AUTO: 0.5 % (ref 0–2)
BUN SERPL-MCNC: 33 MG/DL (ref 8–21)
CALCIUM SERPL-MCNC: 9.1 MG/DL (ref 8.4–11)
CHLORIDE SERPL-SCNC: 116 MMOL/L (ref 98–107)
CREAT SERPL-MCNC: 0.31 MG/DL (ref 0.55–1.3)
EOSINOPHIL # BLD AUTO: 0.7 K/UL (ref 0–0.4)
EOSINOPHIL NFR BLD AUTO: 10.4 % (ref 0–4)
ERYTHROCYTE [DISTWIDTH] IN BLOOD BY AUTOMATED COUNT: 16.8 % (ref 9–15)
GFR SERPL CREATININE-BSD FRML MDRD: (no result) ML/MIN (ref 90–?)
GLUCOSE SERPL-MCNC: 105 MG/DL (ref 70–99)
HCT VFR BLD AUTO: 26.1 % (ref 36–54)
HGB BLD-MCNC: 8.5 G/DL (ref 14–18)
LYMPHOCYTES # BLD AUTO: 1.4 K/UL (ref 1–5.5)
LYMPHOCYTES NFR BLD AUTO: 20.3 % (ref 20.5–51.5)
MCH RBC QN AUTO: 29 PG (ref 27–31)
MCHC RBC AUTO-ENTMCNC: 33 % (ref 32–36)
MCV RBC AUTO: 89 FL (ref 79–98)
MONOCYTES # BLD MANUAL: 0.3 K/UL (ref 0–1)
MONOCYTES # BLD MANUAL: 5.1 % (ref 1.7–9.3)
NEUTROPHILS # BLD AUTO: 4.4 K/UL (ref 1.8–7.7)
NEUTROPHILS NFR BLD AUTO: 63.7 % (ref 40–70)
PLATELET # BLD AUTO: 182 K/UL (ref 130–430)
POTASSIUM SERPL-SCNC: 3.3 MMOL/L (ref 3.5–5.1)
RBC # BLD AUTO: 2.95 MIL/UL (ref 4.2–6.2)
SODIUM SERPLBLD-SCNC: 147 MMOL/L (ref 136–145)
WBC # BLD AUTO: 6.8 K/UL (ref 4.8–10.8)

## 2018-09-03 RX ADMIN — TAMSULOSIN HYDROCHLORIDE SCH MG: 0.4 CAPSULE ORAL at 20:11

## 2018-09-03 RX ADMIN — FINASTERIDE SCH MG: 5 TABLET, FILM COATED ORAL at 09:04

## 2018-09-03 RX ADMIN — METOCLOPRAMIDE HYDROCHLORIDE SCH MG: 5 SOLUTION ORAL at 20:11

## 2018-09-03 RX ADMIN — DEXTROSE MONOHYDRATE SCH MLS/HR: 50 INJECTION, SOLUTION INTRAVENOUS at 09:08

## 2018-09-03 RX ADMIN — OXYCODONE HYDROCHLORIDE AND ACETAMINOPHEN SCH MG: 500 TABLET ORAL at 20:16

## 2018-09-03 RX ADMIN — SODIUM BICARBONATE SCH MLS/HR: 84 INJECTION, SOLUTION INTRAVENOUS at 13:54

## 2018-09-03 RX ADMIN — METOCLOPRAMIDE HYDROCHLORIDE SCH MG: 5 SOLUTION ORAL at 09:07

## 2018-09-03 RX ADMIN — LOPERAMIDE HYDROCHLORIDE SCH MG: 2 CAPSULE ORAL at 20:11

## 2018-09-03 RX ADMIN — LACTULOSE SCH ML: 10 SOLUTION ORAL; RECTAL at 09:05

## 2018-09-03 RX ADMIN — HEPARIN SODIUM AND DEXTROSE PRN MLS/HR: 10000; 5 INJECTION INTRAVENOUS at 13:52

## 2018-09-03 RX ADMIN — HEPARIN SODIUM AND DEXTROSE PRN MLS/HR: 10000; 5 INJECTION INTRAVENOUS at 00:08

## 2018-09-03 RX ADMIN — IPRATROPIUM BROMIDE AND ALBUTEROL SULFATE SCH ML: .5; 3 SOLUTION RESPIRATORY (INHALATION) at 07:32

## 2018-09-03 RX ADMIN — SODIUM BICARBONATE SCH MLS/HR: 84 INJECTION, SOLUTION INTRAVENOUS at 02:31

## 2018-09-03 RX ADMIN — LOPERAMIDE HYDROCHLORIDE SCH MG: 2 CAPSULE ORAL at 02:31

## 2018-09-03 RX ADMIN — Medication SCH GM: at 09:05

## 2018-09-03 RX ADMIN — Medication SCH UNIT: at 09:08

## 2018-09-03 RX ADMIN — LOPERAMIDE HYDROCHLORIDE SCH MG: 2 CAPSULE ORAL at 09:11

## 2018-09-03 RX ADMIN — LOPERAMIDE HYDROCHLORIDE SCH MG: 2 CAPSULE ORAL at 13:49

## 2018-09-03 RX ADMIN — IPRATROPIUM BROMIDE AND ALBUTEROL SULFATE SCH ML: .5; 3 SOLUTION RESPIRATORY (INHALATION) at 01:01

## 2018-09-03 RX ADMIN — SOTALOL HYDROCHLORIDE SCH MG: 80 TABLET ORAL at 09:08

## 2018-09-03 RX ADMIN — IPRATROPIUM BROMIDE AND ALBUTEROL SULFATE SCH ML: .5; 3 SOLUTION RESPIRATORY (INHALATION) at 13:26

## 2018-09-03 RX ADMIN — OXYCODONE HYDROCHLORIDE AND ACETAMINOPHEN SCH MG: 500 TABLET ORAL at 09:07

## 2018-09-03 RX ADMIN — SOTALOL HYDROCHLORIDE SCH MG: 80 TABLET ORAL at 20:16

## 2018-09-03 RX ADMIN — LEVOFLOXACIN SCH MLS/HR: 250 INJECTION, SOLUTION INTRAVENOUS at 20:17

## 2018-09-03 RX ADMIN — Medication SCH UNIT: at 20:11

## 2018-09-03 RX ADMIN — METOCLOPRAMIDE HYDROCHLORIDE SCH MG: 5 SOLUTION ORAL at 16:15

## 2018-09-03 RX ADMIN — MULTIVITAMIN SCH ML: LIQUID ORAL at 09:07

## 2018-09-03 RX ADMIN — IPRATROPIUM BROMIDE AND ALBUTEROL SULFATE SCH ML: .5; 3 SOLUTION RESPIRATORY (INHALATION) at 19:49

## 2018-09-03 RX ADMIN — HEPARIN SODIUM AND DEXTROSE PRN MLS/HR: 10000; 5 INJECTION INTRAVENOUS at 07:21

## 2018-09-03 NOTE — NUR
Patient opens eyes, track occasionally. On 2L NC. Gtube feeding is running at 50ml/hr. Right 
ileostomy noted, draining brownish liquid. PICC at right upper arm, with D5W at 40ml/hr. 
Heparin drip is infusing at 12ml/hr. Call light in place, bed locked at the lowest position, 
will continue to monitor.

## 2018-09-03 NOTE — NUR
Rounds



Reposition patient in bed. No c/o pain and no s/s of any distress noted. Call light in 
reach, will cont to monitor.

## 2018-09-03 NOTE — NUR
Dr Silva replace Supra cath



Dr Silva replace suprapubic cath. Use 22F and 10cc balloon. Dressing was also applied. 
Tolerated well. Will cont to monitor.

-------------------------------------------------------------------------------

Addendum: 09/03/18 at 2332 by Thiago Bryant RN

-------------------------------------------------------------------------------

correction:pls disregard wrong patient

## 2018-09-03 NOTE — NUR
CLosing Notes

Patient in bed resting. Beckman catheter draining by gravity yellow urine. Ileostomy site 
clean and intact. Upper picc line dressing clean dry and intact with no swelling or sign of 
infection. GT feeding had 5ml residual and flushed q6 hours with 100ml of h2o.  Patient 
repositioned q2 hours and all needs have been met. Will endorse to oncoming nurse.

## 2018-09-03 NOTE — NUR
Initial Notes



Received patient in bed with mother at bedside. Patient is bed awake alert oriented x1. No 
s/s of any distress noted. IV noted to L upper arm  G 20 and R f/a g 22 both no infiltrate 
and with good blood return. Hep drip noted running at 12ml/hr. G tube patent  with 10cc 
residual. Beckman cath noted with yellow urine amounting to 100cc in the bag . All extremities 
are stiff and showing contractio. Call light in reach, will cont to monitor.

-------------------------------------------------------------------------------

Addendum: 09/03/18 at 1939 by Thiago Bryant RN

-------------------------------------------------------------------------------

Correction wrong patient

## 2018-09-03 NOTE — NUR
Patient in bed resting comfortably. No pain or acute respiratory distress. Will continue to 
monitor on rounds.

## 2018-09-03 NOTE — NUR
Dr Vicente at bedside



Dr Vicente check patient at bedside. No order given.

-------------------------------------------------------------------------------

Addendum: 09/03/18 at 2332 by Thiago Bryant RN

-------------------------------------------------------------------------------

Correction: pls disregard wrong patient

## 2018-09-03 NOTE — NUR
POA is unable to be contacted via phone; [facility] is also called, but the phone number 
indicated for the patient's POA is the same as the one from patient's chart. Dr. Pearson is 
informed and CTA Chest is rescheduled for tomorrow until POA is available to be contacted.

## 2018-09-03 NOTE — NUR
Rounds



Patient is resting at this time. Partial bed bath perform with Alex FAYE. No s/s of any 
distress noted. Call light in reach, will cont to monitor.

## 2018-09-03 NOTE — NUR
Initial Notes



Received in bed awake alert oriented x1. No s/s of any distress noted. PICC noted BERNADINE both 
no infiltrate and with good blood return. Hep drip noted running at 12ml/hr. G tube patent  
with 10cc residual. Beckman cath noted with yellow urine amounting to 100cc in the bag . All 
extremities are stiff and showing contraction. Call light in reach, will cont to monitor.

## 2018-09-03 NOTE — NUR
Patient awake in bed with no appearance of pain or respiratory distress. O2 sat at 97% on 2L 
NC. Will monitor on rounds.

## 2018-09-03 NOTE — NUR
Nutrition Update



Clifford Scale 11 noted.

Pt admitted for acute respiratory failure.

Diet: Pivot 1.5 at 50 ml/hr, Free Water Flush: 100 ML Q6H via GT

BMI: 16.3 kg/m2



RD to follow per nutrition care standards.

## 2018-09-03 NOTE — NUR
Dietitian Recommendations 



* Recommend continuing Pivot 1.5 at 50 ml/hr, Free Water Flush: 100 ML Q6H via GT per MD

Provides: 1800 kcal/day, 113 gm protein/day, and 1311 ml free water/day

Meets: 105% of upper end of estimated caloric needs and 153% of upper end of estimated 
protein needs

LP, RD



Please refer to Nutrition Assessment for details.

## 2018-09-04 VITALS — SYSTOLIC BLOOD PRESSURE: 102 MMHG

## 2018-09-04 VITALS — SYSTOLIC BLOOD PRESSURE: 105 MMHG

## 2018-09-04 VITALS — SYSTOLIC BLOOD PRESSURE: 113 MMHG

## 2018-09-04 VITALS — SYSTOLIC BLOOD PRESSURE: 108 MMHG

## 2018-09-04 VITALS — SYSTOLIC BLOOD PRESSURE: 115 MMHG

## 2018-09-04 LAB
ANION GAP SERPL CALCULATED.3IONS-SCNC: 4 MMOL/L (ref 5–15)
BASOPHILS NFR BLD MANUAL: 0 % (ref 0–2)
BUN SERPL-MCNC: 25 MG/DL (ref 8–21)
CALCIUM SERPL-MCNC: 9 MG/DL (ref 8.4–11)
CHLORIDE SERPL-SCNC: 109 MMOL/L (ref 98–107)
CREAT SERPL-MCNC: 0.38 MG/DL (ref 0.55–1.3)
EOSINOPHIL NFR BLD MANUAL: 10 % (ref 0–7)
ERYTHROCYTE [DISTWIDTH] IN BLOOD BY AUTOMATED COUNT: 16.8 % (ref 9–15)
GFR SERPL CREATININE-BSD FRML MDRD: (no result) ML/MIN (ref 90–?)
GLUCOSE SERPL-MCNC: 125 MG/DL (ref 70–99)
HCT VFR BLD AUTO: 26.8 % (ref 36–54)
HGB BLD-MCNC: 8.9 G/DL (ref 14–18)
LYMPHOCYTES NFR BLD MANUAL: 29 % (ref 20–46)
MCH RBC QN AUTO: 29 PG (ref 27–31)
MCHC RBC AUTO-ENTMCNC: 33 % (ref 32–36)
MCV RBC AUTO: 88 FL (ref 79–98)
MONOCYTES # BLD MANUAL: 5 % (ref 0–11)
NEUTS BAND NFR BLD MANUAL: 4 % (ref 0–6)
PLATELET # BLD AUTO: 202 K/UL (ref 130–430)
POTASSIUM SERPL-SCNC: 3.7 MMOL/L (ref 3.5–5.1)
RBC # BLD AUTO: 3.03 MIL/UL (ref 4.2–6.2)
SODIUM SERPLBLD-SCNC: 138 MMOL/L (ref 136–145)
VARIANT LYMPHS NFR BLD MANUAL: 0 % (ref 0–0)
WBC # BLD AUTO: 6.1 K/UL (ref 4.8–10.8)

## 2018-09-04 RX ADMIN — DEXTROSE MONOHYDRATE SCH MLS/HR: 50 INJECTION, SOLUTION INTRAVENOUS at 09:54

## 2018-09-04 RX ADMIN — IPRATROPIUM BROMIDE AND ALBUTEROL SULFATE SCH ML: .5; 3 SOLUTION RESPIRATORY (INHALATION) at 07:01

## 2018-09-04 RX ADMIN — METOCLOPRAMIDE HYDROCHLORIDE SCH MG: 5 SOLUTION ORAL at 18:49

## 2018-09-04 RX ADMIN — LEVOFLOXACIN SCH MLS/HR: 250 INJECTION, SOLUTION INTRAVENOUS at 22:08

## 2018-09-04 RX ADMIN — SODIUM FERRIC GLUCONATE COMPLEX IN SUCROSE SCH MLS/HR: 12.5 INJECTION INTRAVENOUS at 18:50

## 2018-09-04 RX ADMIN — Medication SCH UNIT: at 22:03

## 2018-09-04 RX ADMIN — OXYCODONE HYDROCHLORIDE AND ACETAMINOPHEN SCH MG: 500 TABLET ORAL at 22:02

## 2018-09-04 RX ADMIN — IPRATROPIUM BROMIDE AND ALBUTEROL SULFATE SCH ML: .5; 3 SOLUTION RESPIRATORY (INHALATION) at 13:15

## 2018-09-04 RX ADMIN — IPRATROPIUM BROMIDE AND ALBUTEROL SULFATE SCH ML: .5; 3 SOLUTION RESPIRATORY (INHALATION) at 00:16

## 2018-09-04 RX ADMIN — SOTALOL HYDROCHLORIDE SCH MG: 80 TABLET ORAL at 09:56

## 2018-09-04 RX ADMIN — HEPARIN SODIUM AND DEXTROSE PRN MLS/HR: 10000; 5 INJECTION INTRAVENOUS at 11:50

## 2018-09-04 RX ADMIN — OXYCODONE HYDROCHLORIDE AND ACETAMINOPHEN SCH MG: 500 TABLET ORAL at 09:55

## 2018-09-04 RX ADMIN — LOPERAMIDE HYDROCHLORIDE SCH MG: 2 CAPSULE ORAL at 12:04

## 2018-09-04 RX ADMIN — LOPERAMIDE HYDROCHLORIDE SCH MG: 2 CAPSULE ORAL at 02:04

## 2018-09-04 RX ADMIN — FINASTERIDE SCH MG: 5 TABLET, FILM COATED ORAL at 09:55

## 2018-09-04 RX ADMIN — LOPERAMIDE HYDROCHLORIDE SCH MG: 2 CAPSULE ORAL at 22:02

## 2018-09-04 RX ADMIN — MULTIVITAMIN SCH ML: LIQUID ORAL at 09:56

## 2018-09-04 RX ADMIN — METOCLOPRAMIDE HYDROCHLORIDE SCH MG: 5 SOLUTION ORAL at 22:03

## 2018-09-04 RX ADMIN — METOCLOPRAMIDE HYDROCHLORIDE SCH MG: 5 SOLUTION ORAL at 09:55

## 2018-09-04 RX ADMIN — TAMSULOSIN HYDROCHLORIDE SCH MG: 0.4 CAPSULE ORAL at 22:02

## 2018-09-04 RX ADMIN — Medication SCH CAP: at 22:02

## 2018-09-04 RX ADMIN — ENOXAPARIN SODIUM SCH MG: 40 INJECTION SUBCUTANEOUS at 22:10

## 2018-09-04 RX ADMIN — SODIUM BICARBONATE SCH MLS/HR: 84 INJECTION, SOLUTION INTRAVENOUS at 02:16

## 2018-09-04 RX ADMIN — IPRATROPIUM BROMIDE AND ALBUTEROL SULFATE SCH ML: .5; 3 SOLUTION RESPIRATORY (INHALATION) at 19:42

## 2018-09-04 RX ADMIN — Medication SCH UNIT: at 09:55

## 2018-09-04 RX ADMIN — SOTALOL HYDROCHLORIDE SCH MG: 80 TABLET ORAL at 21:00

## 2018-09-04 NOTE — NUR
rounds

pt was taken for the ct angio chest. zahra from [facility] was called and 
faxed consent and got it back. went to xray via bed and back. no acute distress noted.

## 2018-09-04 NOTE — NUR
Rounds



Patient is resting comfortably in bed at this time. No s/s of any distress and no c/o pain 
noted. Call light in reach. Will cont to monitor.

## 2018-09-04 NOTE — NUR
rounds

sleeps at intervals.. no sob noted. turned repositioned for comfort. gt care was done. skin 
is intact around. cleansed with ns and covered with 4x4 dressing.

## 2018-09-04 NOTE — NUR
closing notes

seen by dr duarte and with orders. heparin was dc/d no bleeding noted. gt feeding azucena well 
and no residual noted. bed in low position and side rails up and locked. call lightcwithin 
reached and patient close to the nurses station. turned repositioned for comfort. no acute 
distress noted.

## 2018-09-04 NOTE — NUR
rounds

turned repositioned for comfort. call light at bedside. no acute distress noted. gt feeding 
azucena well.

## 2018-09-04 NOTE — NUR
Initial note:



Received handoff report from dayshift RN at patient's bedside. Patient is sleeping in bed, 
no signs or symptoms of acute distress noted. PATRICK PICC line noted, site is patent and benign 
with good blood return. Tubefeeding in place, patient tolerating tubefeeding well with 
gastric residual of 20 ML. Ileostomy noted with 50 ML of liquid stool in ileostomy bag. 
Safety and fall precautions in place. Call light is with patient. Will continue with plan of 
care.

## 2018-09-04 NOTE — NUR
Rounds



Patient is resting at this tiem.Reposition in bed for comfort. No s/s of any distress noted. 
Call light in reach. Will cont to monitor.

## 2018-09-04 NOTE — NUR
Called Son but not available



Called Kenn Tyler, bartolo but not yet present at work. Try again at 0800 as per co worker. 
Will endorse to AM shift.

## 2018-09-04 NOTE — NUR
initial notes

rec patient with hob elevated, awake opens eyes but non verbally responsive. picc line on 
the r upper arm in place. no infiltration oted. call light within reached but unable to call 
light. pt close to the nurses station. bed in low position and side rails up and locked. gt 
feeding azucena well and no residual noted. will continue to monitor patient.

## 2018-09-04 NOTE — NUR
Rounds:



Patient is awake in bed, no signs or symptoms of acute distress noted. Provided oral care. 
Safety and fall precautions in place, call light is with patient. Will continue monitoring.

## 2018-09-04 NOTE — NUR
End of shift notes



Patient is resting at this time. No s/s of any distress and no c/o pain noted. All needs met 
and anticipated by noc nurses. Call light in reach, bed alarm on and side rails up x3 for 
safety. Will endorse care to AM nurse

## 2018-09-05 VITALS — SYSTOLIC BLOOD PRESSURE: 110 MMHG

## 2018-09-05 VITALS — SYSTOLIC BLOOD PRESSURE: 98 MMHG

## 2018-09-05 VITALS — SYSTOLIC BLOOD PRESSURE: 102 MMHG

## 2018-09-05 VITALS — SYSTOLIC BLOOD PRESSURE: 88 MMHG

## 2018-09-05 LAB
ANION GAP SERPL CALCULATED.3IONS-SCNC: 6 MMOL/L (ref 5–15)
BASOPHILS # BLD AUTO: 0 K/UL (ref 0–0.2)
BASOPHILS NFR BLD AUTO: 0.3 % (ref 0–2)
BUN SERPL-MCNC: 22 MG/DL (ref 8–21)
CALCIUM SERPL-MCNC: 8.9 MG/DL (ref 8.4–11)
CHLORIDE SERPL-SCNC: 106 MMOL/L (ref 98–107)
CREAT SERPL-MCNC: 0.37 MG/DL (ref 0.55–1.3)
EOSINOPHIL # BLD AUTO: 0.6 K/UL (ref 0–0.4)
EOSINOPHIL NFR BLD AUTO: 9.6 % (ref 0–4)
ERYTHROCYTE [DISTWIDTH] IN BLOOD BY AUTOMATED COUNT: 16 % (ref 9–15)
GFR SERPL CREATININE-BSD FRML MDRD: (no result) ML/MIN (ref 90–?)
GLUCOSE SERPL-MCNC: 117 MG/DL (ref 70–99)
HCT VFR BLD AUTO: 28.2 % (ref 36–54)
HGB BLD-MCNC: 9.5 G/DL (ref 14–18)
LYMPHOCYTES # BLD AUTO: 1.4 K/UL (ref 1–5.5)
LYMPHOCYTES NFR BLD AUTO: 22.8 % (ref 20.5–51.5)
MCH RBC QN AUTO: 29 PG (ref 27–31)
MCHC RBC AUTO-ENTMCNC: 34 % (ref 32–36)
MCV RBC AUTO: 86 FL (ref 79–98)
MONOCYTES # BLD MANUAL: 0.3 K/UL (ref 0–1)
MONOCYTES # BLD MANUAL: 5.2 % (ref 1.7–9.3)
NEUTROPHILS # BLD AUTO: 3.9 K/UL (ref 1.8–7.7)
NEUTROPHILS NFR BLD AUTO: 62.1 % (ref 40–70)
PLATELET # BLD AUTO: 211 K/UL (ref 130–430)
POTASSIUM SERPL-SCNC: 3.8 MMOL/L (ref 3.5–5.1)
RBC # BLD AUTO: 3.27 MIL/UL (ref 4.2–6.2)
SODIUM SERPLBLD-SCNC: 138 MMOL/L (ref 136–145)
WBC # BLD AUTO: 6.2 K/UL (ref 4.8–10.8)

## 2018-09-05 RX ADMIN — IPRATROPIUM BROMIDE AND ALBUTEROL SULFATE SCH ML: .5; 3 SOLUTION RESPIRATORY (INHALATION) at 07:00

## 2018-09-05 RX ADMIN — OXYCODONE HYDROCHLORIDE AND ACETAMINOPHEN SCH MG: 500 TABLET ORAL at 08:39

## 2018-09-05 RX ADMIN — SODIUM FERRIC GLUCONATE COMPLEX IN SUCROSE SCH MLS/HR: 12.5 INJECTION INTRAVENOUS at 16:55

## 2018-09-05 RX ADMIN — LOPERAMIDE HYDROCHLORIDE SCH MG: 2 CAPSULE ORAL at 14:22

## 2018-09-05 RX ADMIN — METOCLOPRAMIDE HYDROCHLORIDE SCH MG: 5 SOLUTION ORAL at 20:59

## 2018-09-05 RX ADMIN — IPRATROPIUM BROMIDE AND ALBUTEROL SULFATE SCH ML: .5; 3 SOLUTION RESPIRATORY (INHALATION) at 13:12

## 2018-09-05 RX ADMIN — LOPERAMIDE HYDROCHLORIDE SCH MG: 2 CAPSULE ORAL at 05:05

## 2018-09-05 RX ADMIN — Medication SCH CAP: at 21:00

## 2018-09-05 RX ADMIN — OXYCODONE HYDROCHLORIDE AND ACETAMINOPHEN SCH MG: 500 TABLET ORAL at 21:01

## 2018-09-05 RX ADMIN — METOCLOPRAMIDE HYDROCHLORIDE SCH MG: 5 SOLUTION ORAL at 08:38

## 2018-09-05 RX ADMIN — TAMSULOSIN HYDROCHLORIDE SCH MG: 0.4 CAPSULE ORAL at 08:38

## 2018-09-05 RX ADMIN — Medication SCH UNIT: at 08:40

## 2018-09-05 RX ADMIN — METOCLOPRAMIDE HYDROCHLORIDE SCH MG: 5 SOLUTION ORAL at 14:22

## 2018-09-05 RX ADMIN — FINASTERIDE SCH MG: 5 TABLET, FILM COATED ORAL at 08:40

## 2018-09-05 RX ADMIN — TAMSULOSIN HYDROCHLORIDE SCH MG: 0.4 CAPSULE ORAL at 21:01

## 2018-09-05 RX ADMIN — MULTIVITAMIN SCH ML: LIQUID ORAL at 08:39

## 2018-09-05 RX ADMIN — SOTALOL HYDROCHLORIDE SCH MG: 80 TABLET ORAL at 21:01

## 2018-09-05 RX ADMIN — IPRATROPIUM BROMIDE AND ALBUTEROL SULFATE SCH ML: .5; 3 SOLUTION RESPIRATORY (INHALATION) at 00:52

## 2018-09-05 RX ADMIN — ENOXAPARIN SODIUM SCH MG: 40 INJECTION SUBCUTANEOUS at 21:03

## 2018-09-05 RX ADMIN — Medication SCH UNIT: at 21:01

## 2018-09-05 RX ADMIN — IPRATROPIUM BROMIDE AND ALBUTEROL SULFATE SCH ML: .5; 3 SOLUTION RESPIRATORY (INHALATION) at 19:52

## 2018-09-05 RX ADMIN — SOTALOL HYDROCHLORIDE SCH MG: 80 TABLET ORAL at 08:39

## 2018-09-05 RX ADMIN — LEVOFLOXACIN SCH MLS/HR: 250 INJECTION, SOLUTION INTRAVENOUS at 21:00

## 2018-09-05 RX ADMIN — Medication SCH CAP: at 08:39

## 2018-09-05 RX ADMIN — DEXTROSE MONOHYDRATE SCH MLS/HR: 50 INJECTION, SOLUTION INTRAVENOUS at 08:38

## 2018-09-05 RX ADMIN — Medication SCH CAP: at 14:22

## 2018-09-05 NOTE — NUR
Opening Note

Patient sleeping in bed, no signs of distress or discomfort on room air. Vital signs taken. 
g tube infusing. PICC line to right upper arm patent and on saline lock. Extensions of RN, 
CNA and Charge RN written on white board. Plan of care discussed with patient. Patient 
confused and unable to verbalize understanding. Safety precautions in order, call light in 
reach and bed alarm on.

## 2018-09-05 NOTE — NUR
WOUND EVALUATION:



Wound Consult received from Dr. Pearson. Thank you, Dr. Pearson, for the consult.



Patient received in a John Bed with an Isoflex MICHAEL mattress with low air loss therapy, 
awake, nonverbal, nonresponsive to verbal commands. Patient is unable to turn in bed 
independently. Clifford Score is a 12. Past Medical History: Intellectually Impaired, G-tube 
with feeding, Dementia, benign prostatic hypertrophy, hypertension, dysphagia, COPD, 
cerebral palsy, recent pneumonia,

Atrial Fibrillation, Diverting Colostomy, Sacral pressure ulcer. Recent Labs: WBC 6.2, RBC 
3.27, hemoglobin 9.5, hematocrit 28.2, BUNs 22, creatinine 0.37, glucose 117, iron 22, TIBC 
145, albumin 1.7, PTT 55.6. Intrinsic factors that delay wound healing: Severe 
Hypoalbuminemia. Extrinsic factors that delay wound healing: Immobility. Microbiology: Urine 
culture results negative. MRSA screen results negative. Blood culture results 2 in 
progress.





Wound Assessment:



1. Right Sacral/Coccygeal/Buttock Area:

Unstageable pressure ulcer, present on admission. Wound bed has 10% yellow slough, 40% black 
tissue, 50% red tissue. No odor, no drainage. Periwound has maceration. Kati-wound and 
surrounding tissue has scar tissue. Wound measures 9.0 cm x 6.0 cm x 0.8 cm.



Recommend: Cleanse wound with normal saline. Place moisture barrier cream onto kati-wound. 
Apply Venelex ointment onto wound bed. Cover with non-adhesive foam dressings. Secure with 
transparent dressings. Perform wound care daily, and as needed for dressing soiling or 
dislodgement.





2. Scrotum:

Erythema from IAD, present on admission. No odor, no drainage. Surrounding tissue has scar 
tissue. 



Recommend: Cleanse site with normal saline. Pat dry. Apply moisture barrier cream to site. 
Perform site care qid, and as needed for soiling.





3. Right Medial Malleolus:

Scar tissue with dark discoloration, present on admission. 



Recommend: Cover site with hydrocolloid dressing. Perform site care q 5 days, and as needed 
for dressing soiling or dislodgement. Offload elevate and float foot with one pillow 
lengthwise at all times. Do not allow foot or malleoli to touch bed or other surfaces at any 
time.





Also recommend: Reposition patient side to side only every 2 hours with pillow support, and 
off-load pressure areas with pillows for pressure re-distribution. Offload, elevate and 
float bilateral heels with one pillow lengthwise under each extremity at all times. Perform 
skin care and monitor skin integrity Q shift. Use moisture barrier cream on buttocks and 
other moisture susceptible areas QID and as needed for soiling. Maintain patient on a low 
air-loss mattress.



Recommend surgical consult.

## 2018-09-05 NOTE — NUR
ROUNDS

PATIENT RESTING COMFORTABLY IN BED, NOT IN DISTRESS, VITALS STABLE, NON VERBAL. NO SIGNS OF 
PAIN AND DISCOMFORT NOTED. ASSESSMENT DONE AND DOCUMENTED. SEE FLOWSHEET. NEEDS ATTENDED TO. 
REPOSITIONED AND MADE COMFORTABLE. SAFETY AND FALL PRECAUTION MEASURES IN PLACED. BED ALARM 
ON. BED IN LOW AND LOCKED POSITION. CALL LIGHT PLACED WITHIN REACH.

## 2018-09-05 NOTE — NUR
Rounds:



Patient is asleep in bed. Does not show any signs or symptoms of acute distress. Bed locked 
in lowest position, side rails raised, bed alarm on. Call light is with patient. Will 
continue to monitor.

## 2018-09-05 NOTE — NUR
Closing Note

Patient a/ox1, non-verbal. No complaints of pain or difficulty breathing on room air. All 
needs met throughout shift. Will endorse plan of care to noc shift. 450ml emptied from 
Ileostomy, Beckman catheter draining to gravity. Safety precautions in order, call light in 
reach and bed alarm on.

## 2018-09-05 NOTE — NUR
Closing note:



Patient is awake in bed, does not show signs or symptoms of acute distress. Right upper arm 
PICC line remains patent and benign. Tubefeeding flowing well to patient's g-tube. Ileostomy 
remains patent and intact. Beckman catheter remains draining clear yellow urine. All needs met 
and attended to. Safety and fall precautions in place. Call light is with patient. Will 
endorse care to dayshift RN.

## 2018-09-05 NOTE — NUR
Rounds:



Patient is in bed sleeping, no signs or symptoms of acute distress noted. Breathing is even 
and unlabored. Call light is with patient. Safety and fall precautions in place. Will 
continue to monitor.

## 2018-09-05 NOTE — NUR
NOTES

PATIENT ACCEPTED BACK TO [facility] AND  TRANSFERRED AS ORDERED. CALLED AND GIVE REPORT TO 
NURSE HERRERA. DISCHARGE PACKET DONE. PATIENT PREPARED FOR DISCHARGE. WILL CONTINUE TO 
MONITOR.

## 2018-09-05 NOTE — NUR
Rounds:



Patient is asleep, does not show signs or symptoms of acute distress. Safety and fall 
precautions in place, call light is with patient. Will continue monitoring.

## 2018-09-05 NOTE — NUR
CLOSING NOTES

PATIENT DISCHARGE TO [facility] AS ORDERED VIA AMBULANCE WITH STABLE VITAL SIGNS. NO SIGNS 
OF ANY PAIN AND DISCOMFORT NOTED. ALL NEEDS ATTENDED TO. PICC LINE ON THE RIGHT UPPER ARM 
PATENT WITH DRESSING, CLEAN, DRY AND INTACT.

## 2018-10-05 ENCOUNTER — HOSPITAL ENCOUNTER (EMERGENCY)
Dept: HOSPITAL 4 - SED | Age: 78
End: 2018-10-05
Payer: MEDICARE

## 2018-10-05 VITALS — WEIGHT: 120 LBS | BODY MASS INDEX: 22.66 KG/M2 | SYSTOLIC BLOOD PRESSURE: 134 MMHG | HEIGHT: 61 IN

## 2018-10-05 DIAGNOSIS — I46.9: Primary | ICD-10-CM

## 2018-10-05 DIAGNOSIS — K21.9: ICD-10-CM

## 2018-10-05 DIAGNOSIS — I48.91: ICD-10-CM

## 2018-10-05 DIAGNOSIS — A41.9: ICD-10-CM

## 2018-10-05 DIAGNOSIS — J44.1: ICD-10-CM

## 2018-10-05 DIAGNOSIS — J96.00: ICD-10-CM

## 2018-10-05 DIAGNOSIS — Z79.899: ICD-10-CM

## 2018-10-05 DIAGNOSIS — J18.9: ICD-10-CM

## 2018-10-05 DIAGNOSIS — I11.0: ICD-10-CM

## 2018-10-05 DIAGNOSIS — I50.9: ICD-10-CM

## 2018-10-05 DIAGNOSIS — R65.20: ICD-10-CM

## 2018-10-05 DIAGNOSIS — F03.90: ICD-10-CM

## 2018-10-05 LAB
ALBUMIN SERPL BCP-MCNC: 2.1 G/DL (ref 3.4–4.8)
ALT SERPL W P-5'-P-CCNC: 30 U/L (ref 12–78)
ANION GAP SERPL CALCULATED.3IONS-SCNC: < 3 MMOL/L (ref 5–15)
AST SERPL W P-5'-P-CCNC: 20 U/L (ref 10–37)
BASOPHILS # BLD AUTO: 0.1 K/UL (ref 0–0.2)
BASOPHILS NFR BLD AUTO: 0.4 % (ref 0–2)
BILIRUB SERPL-MCNC: 0.3 MG/DL (ref 0–1)
BUN SERPL-MCNC: 43 MG/DL (ref 8–21)
CALCIUM SERPL-MCNC: 10.2 MG/DL (ref 8.4–11)
CHLORIDE SERPL-SCNC: 115 MMOL/L (ref 98–107)
CREAT SERPL-MCNC: 0.57 MG/DL (ref 0.55–1.3)
EOSINOPHIL # BLD AUTO: 0.1 K/UL (ref 0–0.4)
EOSINOPHIL NFR BLD AUTO: 0.9 % (ref 0–4)
ERYTHROCYTE [DISTWIDTH] IN BLOOD BY AUTOMATED COUNT: 15.6 % (ref 9–15)
GFR SERPL CREATININE-BSD FRML MDRD: (no result) ML/MIN (ref 90–?)
GLUCOSE SERPL-MCNC: 116 MG/DL (ref 70–99)
HCT VFR BLD AUTO: 35.9 % (ref 36–54)
HGB BLD-MCNC: 11.2 G/DL (ref 14–18)
INR PPP: 1 (ref 0.8–1.2)
LYMPHOCYTES # BLD AUTO: 1.3 K/UL (ref 1–5.5)
LYMPHOCYTES NFR BLD AUTO: 8.1 % (ref 20.5–51.5)
MCH RBC QN AUTO: 27 PG (ref 27–31)
MCHC RBC AUTO-ENTMCNC: 31 % (ref 32–36)
MCV RBC AUTO: 87 FL (ref 79–98)
MONOCYTES # BLD MANUAL: 0.8 K/UL (ref 0–1)
MONOCYTES # BLD MANUAL: 4.9 % (ref 1.7–9.3)
NEUTROPHILS # BLD AUTO: 13.7 K/UL (ref 1.8–7.7)
NEUTROPHILS NFR BLD AUTO: 85.7 % (ref 40–70)
PLATELET # BLD AUTO: 327 K/UL (ref 130–430)
POTASSIUM SERPL-SCNC: 4.1 MMOL/L (ref 3.5–5.1)
PROTHROMBIN TIME: 10.4 SECS (ref 9.5–12.5)
RBC # BLD AUTO: 4.11 MIL/UL (ref 4.2–6.2)
SODIUM SERPLBLD-SCNC: 147 MMOL/L (ref 136–145)
WBC # BLD AUTO: 16 K/UL (ref 4.8–10.8)

## 2018-10-05 PROCEDURE — 96375 TX/PRO/DX INJ NEW DRUG ADDON: CPT

## 2018-10-05 PROCEDURE — 82550 ASSAY OF CK (CPK): CPT

## 2018-10-05 PROCEDURE — 85730 THROMBOPLASTIN TIME PARTIAL: CPT

## 2018-10-05 PROCEDURE — 85025 COMPLETE CBC W/AUTO DIFF WBC: CPT

## 2018-10-05 PROCEDURE — 36415 COLL VENOUS BLD VENIPUNCTURE: CPT

## 2018-10-05 PROCEDURE — 92950 HEART/LUNG RESUSCITATION CPR: CPT

## 2018-10-05 PROCEDURE — 94640 AIRWAY INHALATION TREATMENT: CPT

## 2018-10-05 PROCEDURE — 80053 COMPREHEN METABOLIC PANEL: CPT

## 2018-10-05 PROCEDURE — 84484 ASSAY OF TROPONIN QUANT: CPT

## 2018-10-05 PROCEDURE — 96374 THER/PROPH/DIAG INJ IV PUSH: CPT

## 2018-10-05 PROCEDURE — 71045 X-RAY EXAM CHEST 1 VIEW: CPT

## 2018-10-05 PROCEDURE — 93005 ELECTROCARDIOGRAM TRACING: CPT

## 2018-10-05 PROCEDURE — 99291 CRITICAL CARE FIRST HOUR: CPT

## 2018-10-05 PROCEDURE — 85610 PROTHROMBIN TIME: CPT

## 2018-10-05 PROCEDURE — 87040 BLOOD CULTURE FOR BACTERIA: CPT

## 2018-10-05 PROCEDURE — 83605 ASSAY OF LACTIC ACID: CPT

## 2018-10-05 NOTE — NUR
Patient brought in by EMS, BLS, for decreased oxygen saturation. Patient oxygen 
saturation at 84% on 4L NC. Patient tachycardic at 134. Patient increased 
respiratory rate at 32bpm. Patient arrived from [facility]. Informed MD of 
patient condition.

## 2018-10-05 NOTE — NUR
Attempted to notify Prema Fox of patient's status. Left voicemail for Prema Fox to call [facility] when message is received.

## 2018-10-05 NOTE — NUR
Blood drawn from PICC line. Wasted 10mL, followed by 30mL draw. Blood cultures 
x2, lactic acid x1, and labs ordered. Will follow up regarding results. Patient 
tolerated well.

## 2018-10-05 NOTE — NUR
ATTEMPTED TO CALL FAMILY FOR CODE STATUS CONFIRMATION, SPOKE WITH TALHA MCGILL. TALHA STATES THAT HE IS CAREGIVER AT [facility] 
AND HAS TAKEN CARE OF PT FOR LAST 10 YEARS, PT WAS A RESIDENT SINCE 1972. 
TALHA STATES THAT HE HAS NEVER SEEN ANY FAMILY FOR PT AND THAT PT IS A BERG 
OF THE [employer] WITH INTELLECTUALLY

HANDICAPPED. PT HAS A [facility]  JAK CHONG. NUMBERS ARE 
LISTED IN THE CHART. PT HAS A VALID POLST.

## 2018-10-05 NOTE — NUR
Patient was [place] [facility] [place]. ID tags were placed on the 
patient's body bag and patient's toe.

## 2018-10-05 NOTE — NUR
contacted [phone], not  case. referred to [facility]. 
spoke with Tay, [phone] stated patient to be placed in [facility] 
for pickup by local mortuary. chambers catheter, central line, ET tube, and all 
other medcial equipment removed. patient placed in body bag, placed in [facility] per protocol.

## 2018-10-05 NOTE — NUR
Patient saturation low at 84% on breathing treatment. MD assessed patient and 
patient needs to be intubated. Patient was given 10mg Vecuronium Bromide. Dr. Silva intubated, RT at bedside, ventilator at bedside. Patient intubated with 
7.5. Patient vitals remain tachycardic. Patient oxygen saturation post 
intubation is 99%. Initial settings: AC 14; ; 0 PEEP; 100% FiO2.

## 2018-10-09 NOTE — NUR
Social Service Note:

LESLIEW spoke with Fredy Fox ([phone]) from the [facility]; Fredy states that she 
does not believe that pt had any mortuary arrangements in place; pt's family has not been 
involved in over 20 years. LESLIEW spoke with Jo at [facility] 
([phone]); they can accept body and work on death certificate and all proceedings. LESLEIW 
spoke with administration and they have given the approval for pt to be transported to 
[facility]. Jo has arranged for pt's body to be picked up today. TAYLER has 
alerted administration and security.

## 2024-07-17 NOTE — NUR
Med pass. Held Betapace due to low BP (100/53) Detail Level: Detailed Quality 137: Melanoma: Continuity Of Care - Recall System: Patient information entered into a recall system that includes: target date for the next exam specified AND a process to follow up with patients regarding missed or unscheduled appointments room air